# Patient Record
Sex: MALE | Race: WHITE | NOT HISPANIC OR LATINO | Employment: FULL TIME | URBAN - METROPOLITAN AREA
[De-identification: names, ages, dates, MRNs, and addresses within clinical notes are randomized per-mention and may not be internally consistent; named-entity substitution may affect disease eponyms.]

---

## 2018-06-19 ENCOUNTER — CLINICAL SUPPORT (OUTPATIENT)
Dept: REHABILITATION | Facility: HOSPITAL | Age: 73
End: 2018-06-19
Payer: MEDICARE

## 2018-06-19 DIAGNOSIS — M54.41 CHRONIC RIGHT-SIDED LOW BACK PAIN WITH RIGHT-SIDED SCIATICA: ICD-10-CM

## 2018-06-19 DIAGNOSIS — G89.29 CHRONIC RIGHT-SIDED LOW BACK PAIN WITH RIGHT-SIDED SCIATICA: ICD-10-CM

## 2018-06-19 PROCEDURE — 97161 PT EVAL LOW COMPLEX 20 MIN: CPT | Mod: PN

## 2018-06-19 PROCEDURE — G8982 BODY POS GOAL STATUS: HCPCS | Mod: CH,PN

## 2018-06-19 PROCEDURE — G8981 BODY POS CURRENT STATUS: HCPCS | Mod: CI,PN

## 2018-06-19 PROCEDURE — 97110 THERAPEUTIC EXERCISES: CPT | Mod: PN

## 2018-06-27 ENCOUNTER — CLINICAL SUPPORT (OUTPATIENT)
Dept: REHABILITATION | Facility: HOSPITAL | Age: 73
End: 2018-06-27
Payer: MEDICARE

## 2018-06-27 DIAGNOSIS — R19.8 ABDOMINAL WEAKNESS: ICD-10-CM

## 2018-06-27 DIAGNOSIS — M54.41 ACUTE RIGHT-SIDED LOW BACK PAIN WITH RIGHT-SIDED SCIATICA: Primary | ICD-10-CM

## 2018-06-27 DIAGNOSIS — R26.89 DECREASED BACK MOBILITY: ICD-10-CM

## 2018-06-27 DIAGNOSIS — M54.31 RIGHT SCIATIC NERVE PAIN: ICD-10-CM

## 2018-06-27 PROCEDURE — 97110 THERAPEUTIC EXERCISES: CPT | Mod: PN

## 2018-06-27 NOTE — PROGRESS NOTES
"Name: Robetr Jessica  Clinic Number: [id number]  Date of Treatment: 06/27/2018   Diagnosis:   Encounter Diagnoses   Name Primary?    Acute right-sided low back pain with right-sided sciatica Yes    Abdominal weakness     Right sciatic nerve pain     Decreased back mobility        Time in: 0804  Time Out: 1000  Total Treatment Time: 56  Group Time: 0      Subjective:    Robert reports that back and R hip pain is present after just waking and is present now due to not moving a lot yet today.  Patient reports their pain to be 6/10 on a 0-10 scale with 0 being no pain and 10 being the worst pain imaginable. He has work later today.  Suzanna present throughout session.     Objective    Robert received therapeutic exercises to develop strength, endurance, ROM, flexibility, posture and core stabilization for 56 minutes including:     NUStep lvl 5 x 10'  Seated HS stretch 30"x3  Supine piriformis stretch 30"x3  Supine PPT with 5 second hold x 10  Ball squeeze x 30  Bridge + ball squeeze 10 x 3  Cat/cow x 30 with tactile cues  Pulley:   Rows 10 x 3 30#   Shoulder extension 10 x 3 30#    Written Home Exercises Provided: Bridge, HS and piriformis stretch    Pt demo fair understanding of the education provided. Robert demonstrated fair return demonstration of activities.     Assessment:       Pt with need for consistent cueing with exercises, demo'ing preference to perform faster than required for good stabilization. Pt is eager to participate in all activities. He will continue to benefit from skilled PT intervention. Medical Necessity is demonstrated by:  Pain limits function of effected part for some activities and Weakness.    Patient is making good progress towards established goals.    New/Revised goals: NA      Plan:  Continue with established Plan of Care towards PT goals.     Jessica LeJeune, PT, DPT      "

## 2018-06-28 NOTE — PLAN OF CARE
"TIME RECORD    Date: 06/19/2018    Start Time:  0915  Stop Time:  1004    PROCEDURES:    TIMED  Procedure Time Min.   Therapeutic exercise Start: 0950  Stop: 1004 14    Start:  Stop:     Start:  Stop:     Start:  Stop:          UNTIMED  Procedure Time Min.   Evaluation Start: 0915  Stop: 0950 35    Start:  Stop:      Total Timed Minutes:  14  Total Timed Units:  1  Total Untimed Units:  1  Charges Billed/# of units:  2    OUTPATIENT PHYSICAL THERAPY   PATIENT EVALUATION  Onset Date: November 2017  Primary Diagnosis: No diagnosis found.  Treatment Diagnosis: Spinal OA, low back pain  Past Medical History:   Diagnosis Date    Cancer     Prostate    Heartburn     Hypertension      Precautions: Standard  Prior Therapy: None  Medications: Robert Jessica has a current medication list which includes the following prescription(s): cholecalciferol (vitamin d3), fish oil-omega-3 fatty acids, hydrocodone-acetaminophen, lisinopril, lovastatin, multivit/iron/fa/k/herb no.244, and oxybutynin.  Nutrition:  Normal  History of Present Illness: Pt presents with low back and RLE pain that began 11/2017. He had a back surgery (same day - pt sated it "took pressure off the nerve) in April which provided temporary relief but pain has returned and worsened over the last month.   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 They are both deaf and speak ASL.   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 He has had difficulty using the RLE while driving at times and getting in and out of his car. 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 He is able to lift objects and pull open the garage without pain.   Patient Therapy Goals: To decrease pain    Subjective     Robert Jessica states "I feel like the " "pain in my leg is from the nerve.".    Pain:  Location: back and posterior R leg   Description: Aching in low back and Electric in RLE  Activities Which Increase Pain: Laying, Bending, Morning and Getting out of bed/chair  Activities Which Decrease Pain: movement  Pain Scale: 5/10 at best 5/10 now  7-10/10 at worst    Objective     Posture: Decreased curvature of lumbar spine with posterior pelvic tilt, thoracic kyphosis and significant forward head posture.   Palpation: Normal muscle tone, no TTP throughout spine and hips  Sensation: Impaired with c/o numbness to R big toe  Range of Motion/Strength:      Right     Left      Pain/Dysfunction with Movement   Lumbar Spine  AROM PROM MMT  AROM PROM   MMT     Flexion 111  3+/5   3+/5 Excessive ROM from hips vs lumbar spine   Extension 15  3+/5   3+/5 None   Sidebending 10  3+/5 23  3+/5 Pain toward R   Rotation 10  3+/5 10  3+/5  Pain toward R   Tenderness and mildly decreased mobility noted with PA and lateral mobilization from L1-5  Flexibility: Popliteal angle 60° bilaterally  Gait: Without AD  Analysis: No significant gait deviations noted  Bed Mobility:Independent  Transfers: Independent  Other: SLR (+) for nerve pain to proximal calf with ankle DF    Treatment:   - R sciatic nerve glide performed with grade I oscillation at ankle 30 seconds x 3 reps.   - Hooklying LTR with 5 second hold x 5 b/l  - Pt instructed in LTR to perform in AM before getting out of bed    Assessment       Initial Assessment (Pertinent finding, problem list and factors affecting outcome): Pt presents with decreased mobility in lower thoracic and lumbar spine, stiffness with immobility and pain referred to RLE which limits ability with transitions, driving and some work activities. He would benefit from continued PT for increased ROM, improved posture and manual therapy to decrease pain in low back and RLE.   Rehab Potiential: good    Short Term Goals (3 Weeks):   1. Pt will demonstrate " improvement in hamstring flexibility with popliteal angle 65 degrees  2. Pt will perform 5x STS without increase in back pain  3. Pt will be independent with home flexibility program  4. 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 Pt will demo normal PA movement of lumbar vertebrae for improved mobility  Long Term Goals (6 Weeks):   1. Pt will demonstrate improvement in hamstring flexibility with popliteal angle 70 degrees  2. Pt will be independent with home flexibility and nerve glide program  3. 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 Pt will be able to drive x 30 minutes with no difficulty using RLE on pedals    Plan     Certification Period: 6/19/2018 to 7/27/2018  Recommended Treatment Plan: 2 times per week for 6 weeks: Manual Therapy, Moist Heat/ Ice, Neuromuscular Re-ed, Patient Education, Therapeutic Activites and Therapeutic Exercise      Therapist: Jessica L Lejeune, PT    I CERTIFY THE NEED FOR THESE SERVICES FURNISHED UNDER THIS PLAN OF TREATMENT AND WHILE UNDER MY CARE    Physician's comments: ________________________________________________________________________________________________________________________________________________      Physician's Name: ___________________________________

## 2018-06-29 ENCOUNTER — CLINICAL SUPPORT (OUTPATIENT)
Dept: REHABILITATION | Facility: HOSPITAL | Age: 73
End: 2018-06-29
Payer: MEDICARE

## 2018-06-29 DIAGNOSIS — M54.31 RIGHT SCIATIC NERVE PAIN: Primary | ICD-10-CM

## 2018-06-29 PROCEDURE — 97110 THERAPEUTIC EXERCISES: CPT | Mod: PN

## 2018-06-29 NOTE — PROGRESS NOTES
"Name: Robert Jessica  Clinic Number: [id number]  Date of Treatment: 06/29/2018   Diagnosis: No diagnosis found.    Time in: 1600  Time Out: 1700  Total Treatment Time: 60  Group Time: no      Subjective:    Robert reports thigh to calf pain in muscles.  Patient reports their pain to be 7/10 on a 0-10 scale with 0 being no pain and 10 being the worst pain imaginable. Patient stated he walked a lot today and is very tired so he had no relief because he is now really tired after exercising.    Objective    Robert received therapeutic exercises to develop strength, endurance, ROM, flexibility and core stabilization for 60 minutes including:     Nu-Step L-3 x 10 min  Gastroc stretch 2/20" over 1/2 roll  HSS 3/30" with strap in supine using push/pull  Piriformis stretch 2/20"  LTR over Green PB x 30  DKTC over PB x 30  Ball squeeze x 30  Clamshells with GTB x 30  Heel slides x 30 2#  SLR 3 x 10 2#  TaSets in supine over Green PB x 30 with 3 sec hold  Shuttle mini squats 50# x 30  Calf raises 50# x 30  Nerve glides (Supine R leg ext rotated and perform distraction with SLR)      Written Home Exercises Provided: continue with current  Pt demo fair understanding of the education provided. Robert demonstrated good return demonstration of activities.     Assessment:     Patient did well with most exercises but has limited ROM in hamstrings to 45 degrees which could be cause of some back and sciatic pain.  Pt will continue to benefit from skilled PT intervention. Medical Necessity is demonstrated by:  Pain limits function of effected part for some activities, Requires skilled supervision to complete and progress HEP and Weakness.    Patient is making fair progress towards established goals.    Plan:  Continue with established Plan of Care towards PT goals.   "

## 2018-07-02 ENCOUNTER — CLINICAL SUPPORT (OUTPATIENT)
Dept: REHABILITATION | Facility: HOSPITAL | Age: 73
End: 2018-07-02
Payer: MEDICARE

## 2018-07-02 DIAGNOSIS — M54.31 RIGHT SCIATIC NERVE PAIN: ICD-10-CM

## 2018-07-02 DIAGNOSIS — R19.8 ABDOMINAL WEAKNESS: Primary | ICD-10-CM

## 2018-07-02 DIAGNOSIS — R26.89 DECREASED BACK MOBILITY: ICD-10-CM

## 2018-07-02 PROCEDURE — 97110 THERAPEUTIC EXERCISES: CPT | Mod: PN

## 2018-07-02 NOTE — PROGRESS NOTES
"Name: Robert Jessica  Clinic Number: [id number]  Date of Treatment: 07/02/2018   Diagnosis:   Encounter Diagnoses   Name Primary?    Abdominal weakness Yes    Decreased back mobility     Right sciatic nerve pain        Time in: 0801  Time Out: 0905  Total Treatment Time: 64      Subjective:    Robert reports "I was hurting more after my last visit", translated thru .  Patient reports their pain to be 4/10 on a 0-10 scale with 0 being no pain and 10 being the worst pain imaginable.    Objective    Patient received individual therapy to increase strength, endurance, ROM, flexibility, posture and core stabilization with 0 patients with activities as follows:     Robert received therapeutic exercises to develop strength, endurance, ROM, flexibility, posture and core stabilization for 64 minutes including:  Nu-Step L-3 x 10 min  Gastroc stretch 3/30" over 1/2 roll  HSS 3/30" seated  Piriformis stretch in supine 3/30"  LTR over Green PB x 30  DKTC over PB x 30  Ball squeeze x 30  Clamshells with GTB x 30  SLR 3 x 10   Shuttle mini squats 50# x 30  Calf raises 50# x 30      Written Home Exercises Provided: Distributed written and pictoral HEP, with stretches, sleeping positions, and sup<>sit transfer education  Pt demo fair understanding of the education provided. Robert demonstrated good return demonstration of activities.     Assessment:   Patient with increased mobility and confidence following todays visit.  Good participation with minimal barriers in communication for technique and pacing with therex, which patient requires.    Pt will continue to benefit from skilled PT intervention. Medical Necessity is demonstrated by:  Pain limits function of effected part for some activities, Requires skilled supervision to complete and progress HEP and Weakness.    Patient is making good progress towards established goals.    Plan:  Continue with established Plan of Care towards PT goals.   "

## 2018-07-05 ENCOUNTER — CLINICAL SUPPORT (OUTPATIENT)
Dept: REHABILITATION | Facility: HOSPITAL | Age: 73
End: 2018-07-05
Payer: MEDICARE

## 2018-07-05 DIAGNOSIS — R26.89 DECREASED BACK MOBILITY: ICD-10-CM

## 2018-07-05 DIAGNOSIS — M54.31 RIGHT SCIATIC NERVE PAIN: ICD-10-CM

## 2018-07-05 DIAGNOSIS — R19.8 ABDOMINAL WEAKNESS: Primary | ICD-10-CM

## 2018-07-05 PROCEDURE — 97110 THERAPEUTIC EXERCISES: CPT | Mod: PN

## 2018-07-05 NOTE — PROGRESS NOTES
"Name: Robert Jessica  Clinic Number: [id number]  Date of Treatment: 07/05/2018   Diagnosis:   Encounter Diagnoses   Name Primary?    Abdominal weakness Yes    Decreased back mobility     Right sciatic nerve pain        Time in: 0810  Time Out: 0905  Total Treatment Time: 55      Subjective:    Robert reports improvement of symptoms and decreased pain. Pt states(through ASL )  "When I stand up my back hurts, but after 15 min it gets better. I did some of my exercises in packet." Pt reports the pain going down his right leg is not as often. Patient reports their pain to be 4/10 on a 0-10 scale with 0 being no pain and 10 being the worst pain imaginable. Pt reports post tx pain as 3-4/10.    Objective  Robert received therapeutic exercises to develop strength, endurance, ROM, flexibility, posture and core stabilization for 55 minutes including:    Bike L2 x 10 min   Gastroc stretch 3/30" over 1/2 roll   HSS 3/30" seated   Piriformis stretch in sitting 3/30"   LTR over Green PB x 30   DKTC over PB x 30   Ball squeeze x 30   Clamshells with GTB x 30   SLR 3 x 10    Cat/Cow 3/10   PPT 3 x 10    Written Home Exercises Provided: Cont with HEP BID, upright trunk posture education with cues to remind himself daily.   Pt demo good understanding of the education provided. Robert demonstrated good return demonstration of activities.     Assessment:   Pt experienced cramp in hamstrings during PPT. Pt improving with quality of therex performed in session. Remains requiring VC/TC throughout session to ensure correct technique. Decreased lumbar flexion and extension with cat/cow exercise.    Pt will continue to benefit from skilled PT intervention. Medical Necessity is demonstrated by:  Continued inability to participate in vocational pursuits, Pain limits function of effected part for some activities, Requires skilled supervision to complete and progress HEP and Weakness.    Patient is making good progress towards " established goals.    Plan:  Continue with established Plan of Care towards PT goals.

## 2018-07-09 ENCOUNTER — CLINICAL SUPPORT (OUTPATIENT)
Dept: REHABILITATION | Facility: HOSPITAL | Age: 73
End: 2018-07-09
Payer: MEDICARE

## 2018-07-09 DIAGNOSIS — M54.41 CHRONIC RIGHT-SIDED LOW BACK PAIN WITH RIGHT-SIDED SCIATICA: Primary | ICD-10-CM

## 2018-07-09 DIAGNOSIS — G89.29 CHRONIC RIGHT-SIDED LOW BACK PAIN WITH RIGHT-SIDED SCIATICA: Primary | ICD-10-CM

## 2018-07-09 PROBLEM — M54.50 LOW BACK PAIN: Status: ACTIVE | Noted: 2018-07-09

## 2018-07-09 PROBLEM — R29.898 LEG WEAKNESS: Status: ACTIVE | Noted: 2018-07-09

## 2018-07-09 PROCEDURE — G8978 MOBILITY CURRENT STATUS: HCPCS | Mod: CH,PN

## 2018-07-09 PROCEDURE — 97110 THERAPEUTIC EXERCISES: CPT | Mod: PN

## 2018-07-09 NOTE — PROGRESS NOTES
Name: Robert Jessica  Clinic Number: [id number]  Date of Treatment: 07/09/2018   Diagnosis:   Encounter Diagnosis   Name Primary?    Chronic right-sided low back pain with right-sided sciatica Yes       Time in: 0900  Time Out: 1000  Total Treatment Time: 60  Group Time: NA      Subjective:    Robert reports no real change in pain levels.  Patient reports their pain to be 4/10 on a 0-10 scale with 0 being no pain and 10 being the worst pain imaginable.    Objective    Robert received therapeutic exercises to develop strength, endurance and flexibility for 60 minutes including:     X 20 lumbar rotations  X 20 PPT  5 x 10 sec yanna. SKTC  X 20 DKTC w/ green t-ball  X 20 supine yanna. LE there ex (2#) = HS, hip ABD/ADD, SLR  X 20 seated yanna. LE there ex = marching (2#), LAQ (2#), ball squeezes, hip ABD (GTB)  2 x 20 shuttle mini squats and calf raises (62#)  X 10 min NuStep (L4)    Assessment:     Mr. Jessica was able to jyothi. tx session w/out increase in symptoms.    Pt will continue to benefit from skilled PT intervention. Medical Necessity is demonstrated by:  Pain limits function of effected part for some activities, Unable to participate fully in daily activities and Weakness.    Patient is making good progress towards established goals.    New/Revised goals: NA      Plan:  Continue with established Plan of Care towards PT goals.

## 2018-07-12 ENCOUNTER — CLINICAL SUPPORT (OUTPATIENT)
Dept: REHABILITATION | Facility: HOSPITAL | Age: 73
End: 2018-07-12
Payer: MEDICARE

## 2018-07-12 DIAGNOSIS — R29.898 WEAKNESS OF LOWER EXTREMITY, UNSPECIFIED LATERALITY: ICD-10-CM

## 2018-07-12 PROCEDURE — 97110 THERAPEUTIC EXERCISES: CPT | Mod: PN

## 2018-07-12 NOTE — PROGRESS NOTES
"Name: Robert Jessica  Clinic Number: [id number]  Date of Treatment: 07/12/2018   Diagnosis:   Encounter Diagnosis   Name Primary?    Weakness of lower extremity, unspecified laterality        Time in: 0805  Time Out: 0900  Total Treatment Time: 55      Subjective:    Robert reports "My back always hurts in the mornings after I have worked the day before."  Patient reports their pain to be 4/10 on a 0-10 scale with 0 being no pain and 10 being the worst pain imaginable.    Objective  Robert received therapeutic exercises to develop strength, endurance, ROM, flexibility, posture and core stabilization for 55 minutes including:  Bike L2 x 10 min  Gastroc stretch 3/30" over 1/2 roll  HSS 3/30" seated  Piriformis stretch in sitting 3/30"  LTR over Green PB x 30  DKTC over PB x 30  Ball squeeze x 30  Clamshells with GTB x 30  SLR 3 x 10   Cat/Cow 3/10  PPT 3 x 10  Bridges 3 x 10    Written Home Exercises Provided: Cont with HEP  Pt demo good understanding of the education provided. Robert demonstrated good return demonstration of activities.     Assessment:   Good participation with therex, patient consistently getting cramps in hamstrings with core strengthening therex when in hooklying position.    Pt will continue to benefit from skilled PT intervention. Medical Necessity is demonstrated by:  Weakness, fall risk, supervision for progression of HEP.    Patient is making good progress towards established goals.    Plan:  Continue with established Plan of Care towards PT goals.   "

## 2018-07-16 ENCOUNTER — CLINICAL SUPPORT (OUTPATIENT)
Dept: REHABILITATION | Facility: HOSPITAL | Age: 73
End: 2018-07-16
Payer: MEDICARE

## 2018-07-16 DIAGNOSIS — R29.898 WEAKNESS OF LOWER EXTREMITY, UNSPECIFIED LATERALITY: ICD-10-CM

## 2018-07-16 PROCEDURE — 97110 THERAPEUTIC EXERCISES: CPT | Mod: PN

## 2018-07-16 NOTE — PROGRESS NOTES
Name: Robert Jessica  Clinic Number: [id number]  Date of Treatment: 07/16/2018   Diagnosis:   Encounter Diagnosis   Name Primary?    Weakness of lower extremity, unspecified laterality        Time in: 0857  Time Out: 0956  Total Treatment Time: 59      Subjective:    Robert reports pain down R LE, occasional numbness/tingling R lateral LE and ocasionaly great toe region.  Patient reports their pain to be 4/10 on a 0-10 scale with 0 being no pain and 10 being the worst pain imaginable.    Objective    Patient received individual therapy to increase strength, flexibility, posture and core stabilization with activities as follows:     Robert received therapeutic exercises to develop strength, flexibility, posture and core stabilization for 59 minutes including:     Nustep level 4 x 10 minutes  Supine hamstring stretch 3 x 15 sec B,   Piriformis stretch 3 x 20 sec R supine  Bridges x 30  DKC 3 x 30 sec   Ball squeeze x 30  Clamshell GTB supine, R SL, L SL x 30 each  SLR x 30 B  Supine hip abd x 30 B  Neural glide RLE: knee extended, hip apporx 75 deg, df/pf x 30  Push/pull technique for SIJ 2 x 3 sets    Written Home Exercises Provided: cont HEP  Pt demo fair understanding of the education provided. Robert demonstrated fair return demonstration of activities.     Assessment:     Pain decreasing overall.  Pain R hip region with piriformis stretch.  Tight hamstrings B.  Pt will continue to benefit from skilled PT intervention. Medical Necessity is demonstrated by:  Pain limits function of effected part for some activities, Unable to participate fully in daily activities, Requires skilled supervision to complete and progress HEP and Weakness.    Patient is making good progress towards established goals.      Plan:  Continue with established Plan of Care towards PT goals.

## 2018-07-19 ENCOUNTER — CLINICAL SUPPORT (OUTPATIENT)
Dept: REHABILITATION | Facility: HOSPITAL | Age: 73
End: 2018-07-19
Payer: MEDICARE

## 2018-07-19 DIAGNOSIS — R29.898 WEAKNESS OF LOWER EXTREMITY, UNSPECIFIED LATERALITY: ICD-10-CM

## 2018-07-19 PROCEDURE — 97110 THERAPEUTIC EXERCISES: CPT | Mod: PN

## 2018-07-19 NOTE — PROGRESS NOTES
"Name: Robert Jessica  Clinic Number: [id number]  Date of Treatment: 07/19/2018   Diagnosis:   Encounter Diagnosis   Name Primary?    Weakness of lower extremity, unspecified laterality        Time in: 0800  Time Out: 0915  Total Treatment Time: 75    Subjective:    Robert states "When I get out of bed in the morning my pain is a 6/10, however, during the day it decreases to a 2-4/10; walking helps lower my pain to a 2/10." Pt reports tingling down R LE. Pt reports doing push/pull exercises at home since last tx.  Patient reports their pain to be 4/10 on a 0-10 scale with 0 being no pain and 10 being the worst pain imaginable.    Objective    Patient received individual therapy to increase strength, flexibility, posture and core stabilization with 0 patients with activities as follows:     Bike L4 10'  HS stretch seated 3/30s    Supine:  Piriformis stretch 3/30s  Bridges 3/10  DKTC with tball 3/30s  LTR with tball 3/10  Clams GTB supine, R SL, L SL 3/10 each  Ball squeeze 3/10  SLR 3/10 B  TrA with theraball 3/10 with 3" hold  Hip ABD in supine 3/10 B   Neural glide RLE: hip and knee bent with DF/PF 3/10  Push/pull technique for SIJ  2 x 3 sets with submax isometric contraction with 3" hold    Written Home Exercises Provided: cont with HEP.  Pt demo good understanding of the education provided. Robert demonstrated good return demonstration of activities.     Assessment:   Pt tolerated progression of added exercises of TrA and LTR 3/10 each well. Pt stated he could feel his core mm contract better with TrA with theraball in supine vs on bike with same isometric contraction of core. Pt exhibited increased tightness in piriformis. Pt's demonstrates increased quality of eccentric and concentric contractions at a slow, steady pace during SLR exercise. Instructed pt on proper body mechanics for bending and lifting upon leaving session to prevent LBP and strain.     Pt will continue to benefit from skilled PT intervention. " Medical Necessity is demonstrated by:  Continued inability to participate in vocational pursuits, Pain limits function of effected part for some activities, Requires skilled supervision to complete and progress HEP and Weakness.    Patient is making good progress towards established goals.    Plan:  Continue with established Plan of Care towards PT goals.     Therapy was performed with SPTA present for entire treatment session supervised by Licensed PTA.

## 2018-07-23 ENCOUNTER — CLINICAL SUPPORT (OUTPATIENT)
Dept: REHABILITATION | Facility: HOSPITAL | Age: 73
End: 2018-07-23
Payer: MEDICARE

## 2018-07-23 DIAGNOSIS — M54.41 ACUTE RIGHT-SIDED LOW BACK PAIN WITH RIGHT-SIDED SCIATICA: Primary | ICD-10-CM

## 2018-07-23 PROCEDURE — 97110 THERAPEUTIC EXERCISES: CPT | Mod: PN

## 2018-07-23 NOTE — PROGRESS NOTES
Name: Robert Jessica  Clinic Number: [id number]  Date of Treatment: 07/23/2018   Diagnosis:   Encounter Diagnosis   Name Primary?    Acute right-sided low back pain with right-sided sciatica Yes       Time in: 0855  Time Out: 0953  Total Treatment Time: 58      Subjective:    Robert reports a little better today.  Patient reports their pain to be 2/10 on a 0-10 scale with 0 being no pain and 10 being the worst pain imaginable.    Objective    Patient received individual therapy to increase strength, flexibility, posture and core stabilization with activities as follows:     Robert received therapeutic exercises to develop strength, flexibility, posture and core stabilization for 58 minutes including:     Nustep level 4 x 10 minutes  Standing gastroc stretch 3 x 30 sec B  Piriformis stretch 3 x 30 sec B  Standing  TrA x 30  PPT x 30  Bridges x 30  LTR x 30 B  Clamshells: GTB; supine, R SL, L SL x 30 each  Dying bug x 15 B  Supine hip abd x 30 B  SIJ mobilization, push/pull technique 2 x 3 sets    Written Home Exercises Provided: cont HEP  Pt demo good understanding of the education provided. Robert demonstrated good return demonstration of activities.     Assessment:     Pain decreasing overall.  Good tolerance for activity.  Pt will continue to benefit from skilled PT intervention. Medical Necessity is demonstrated by:  Pain limits function of effected part for some activities, Unable to participate fully in daily activities, Requires skilled supervision to complete and progress HEP and Weakness.    Patient is making good progress towards established goals.      Plan:  Continue with established Plan of Care towards PT goals.

## 2018-07-27 ENCOUNTER — CLINICAL SUPPORT (OUTPATIENT)
Dept: REHABILITATION | Facility: HOSPITAL | Age: 73
End: 2018-07-27
Payer: MEDICARE

## 2018-07-27 DIAGNOSIS — R29.898 WEAKNESS OF LOWER EXTREMITY, UNSPECIFIED LATERALITY: ICD-10-CM

## 2018-07-27 DIAGNOSIS — M54.41 ACUTE RIGHT-SIDED LOW BACK PAIN WITH RIGHT-SIDED SCIATICA: ICD-10-CM

## 2018-07-27 DIAGNOSIS — M54.31 RIGHT SCIATIC NERVE PAIN: ICD-10-CM

## 2018-07-27 PROCEDURE — 97110 THERAPEUTIC EXERCISES: CPT | Mod: PN | Performed by: PHYSICAL THERAPIST

## 2018-07-27 PROCEDURE — G8978 MOBILITY CURRENT STATUS: HCPCS | Mod: CI,PN | Performed by: PHYSICAL THERAPIST

## 2018-07-27 PROCEDURE — G8979 MOBILITY GOAL STATUS: HCPCS | Mod: CI,PN | Performed by: PHYSICAL THERAPIST

## 2018-07-29 NOTE — PLAN OF CARE
"TIME RECORD    Date: 07/29/2018    Start Time:  900  Stop Time:  1000    PROCEDURES:    TIMED  Procedure Time Min.    Start:  Stop:     Start:  Stop:     Start:  Stop:     Start:  Stop:          UNTIMED  Procedure Time Min.    Start:  Stop:     Start:  Stop:      Total Timed Minutes:  60  Total Timed Units:  4  Total Untimed Units:  0  Charges Billed/# of units:  4    PHYSICAL THERAPY UPDATED PLAN OF TREATMENT    Patient name: Robert Jessica  Onset Date:  November 2007  SOC Date:  6/19/2018  Primary Diagnosis:    1. Acute right-sided low back pain with right-sided sciatica     2. Weakness of lower extremity, unspecified laterality     3. 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 He has had difficulty using the RLE while driving at times and getting in and out of his car. 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 He is able to lift objects and pull open the garage without pain    Updated Assessment:    S: The patient reports decreased pain but continues to have pain with prolonged activty    O:  LROM   Previous  Current  Flexion   NT   28*   Extension  15*   15*  R side bending 10*   13*  L side bending  13*   13*    Flexibility:   Left Right  Left Right  Hamstrings  60* 60*  70* 70*         Oswestry  NT%   16%  Impairment    A: The patent is progressing with decreased pain.    Previous Short Term Goals Status:     1. Pt will demonstrate improvement in hamstring flexibility with popliteal angle 65 degrees  2. Pt will perform 5x STS without increase in back pain  3. Pt will be independent with home flexibility program  4. 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 Pt will demo normal PA movement of lumbar vertebrae for improved " mobility    Long Term Goal Status:   modified:    1. Pt will demonstrate improvement in hamstring flexibility with popliteal angle 80 degrees  2. Pt will be independent with home flexibility and nerve glide program  3. 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 Pt will be able to drive x 30 minutes with no difficulty using RLE on pedals    Reasons for Recertification of Therapy:   Improve LE flexibility and and decrease pain with activty    Certification Period: 7/31/2018 to 9/18/2018  Recommended Treatment Plan: 2 times per week for 6 weeks: Cervical/Lumbar Traction, Manual Therapy, Moist Heat/ Ice, Patient Education, Therapeutic Activites and Therapeutic Exercise  Other Recommendations: Progress as tolerated        Therapist's Name: Misha Fitch, PT   Date: 07/29/2018    I CERTIFY THE NEED FOR THESE SERVICES FURNISHED UNDER THIS PLAN OF TREATMENT AND WHILE UNDER MY CARE    Physician's comments: ________________________________________________________________________________________________________________________________________________      Physician's Name: ___________________________________

## 2018-07-29 NOTE — PROGRESS NOTES
"Name: Robert Jessica  [facility] Number: [id number]  Date of Treatment: 07/29/2018   Diagnosis:   Encounter Diagnoses   Name Primary?    Acute right-sided low back pain with right-sided sciatica     Weakness of lower extremity, unspecified laterality     Right sciatic nerve pain        Time in: 900  Time Out: 1000  Total Treatment Time: 60  Group Time: 0      Subjective:    Robert reports improvement of symptoms.  Patient reports their pain to be 3/10 on a 0-10 scale with 0 being no pain and 10 being the worst pain imaginable.    Objective    Patient received individual therapy to increase strength, endurance, ROM and flexibility with 0 patients with activities as follows:     Robert received therapeutic exercises to develop strength, endurance, ROM and flexibility for 60 minutes including:     NuStep L2 x 10 min  Gastroc stretch 3/30" over 1/2 roll  HSS 3/30" seated  Piriformis stretch in sitting 3/30"  LTR over Green PB x 30  DKTC over PB x 30  Ball squeeze x 30  Clamshells with GTB x 30  SLR 3 x 10   Cat/Cow 3/10  PPT 3 x 10  Bridges 3 x 10      Assessment:       Pt will continue to benefit from skilled PT intervention. Medical Necessity is demonstrated by:  Requires skilled supervision to complete and progress HEP and Weakness.    Patient is making good progress towards established goals.    New/Revised goals: See UPOC      Plan:  Continue with established Plan of Care towards PT goals.   "

## 2018-08-01 ENCOUNTER — CLINICAL SUPPORT (OUTPATIENT)
Dept: REHABILITATION | Facility: HOSPITAL | Age: 73
End: 2018-08-01
Attending: NEUROLOGICAL SURGERY
Payer: MEDICARE

## 2018-08-01 DIAGNOSIS — R29.898 WEAKNESS OF LOWER EXTREMITY, UNSPECIFIED LATERALITY: ICD-10-CM

## 2018-08-01 DIAGNOSIS — M54.41 ACUTE RIGHT-SIDED LOW BACK PAIN WITH RIGHT-SIDED SCIATICA: ICD-10-CM

## 2018-08-01 DIAGNOSIS — R26.89 DECREASED BACK MOBILITY: ICD-10-CM

## 2018-08-01 DIAGNOSIS — M54.31 RIGHT SCIATIC NERVE PAIN: ICD-10-CM

## 2018-08-01 PROCEDURE — G8979 MOBILITY GOAL STATUS: HCPCS | Mod: CI,PN

## 2018-08-01 PROCEDURE — G8980 MOBILITY D/C STATUS: HCPCS | Mod: CI,PN

## 2018-08-01 PROCEDURE — 97110 THERAPEUTIC EXERCISES: CPT | Mod: PN

## 2018-08-05 NOTE — PLAN OF CARE
REHAB SERVICES OUTPATIENT DISCHARGE SUMMARY  Physical Therapy      Name:  Robert Jessica  Date:  08/01/2018  Date of Evaluation:  06/19/2018  Physician:  Dr. Nikhil Duggan MD  Total # Of Visits:  12  Cancelled:  0  No Shows:  0  Diagnosis:    1. Acute right-sided low back pain with right-sided sciatica     2. Weakness of lower extremity, unspecified laterality     3. Right sciatic nerve pain         Physical/Functional Status:  At time of discharge, patient was able to Stand at sink to brush teeth/shave with minimal pain, drive with decreased difficulty using R LE, perform sit <> stand with minimal pain, and rise in the morning with less pain.  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      The patient is to be discharged from our Therapy service for the following reason(s):  Patient has essentially met all of his/her goals    Degree of Goal Achievement:  Patient has nearly met all goals    Patient Education:  N/A    Discharge Plan:  Home Program:  Issued illustrated home exercise program

## 2020-02-12 ENCOUNTER — TELEPHONE (OUTPATIENT)
Dept: FAMILY MEDICINE | Facility: CLINIC | Age: 75
End: 2020-02-12

## 2020-02-12 DIAGNOSIS — Z79.899 ENCOUNTER FOR LONG-TERM (CURRENT) USE OF OTHER MEDICATIONS: ICD-10-CM

## 2020-02-12 DIAGNOSIS — I10 HYPERTENSION, UNSPECIFIED TYPE: Chronic | ICD-10-CM

## 2020-02-12 DIAGNOSIS — Z00.00 ROUTINE GENERAL MEDICAL EXAMINATION AT A HEALTH CARE FACILITY: Primary | ICD-10-CM

## 2020-02-12 NOTE — TELEPHONE ENCOUNTER
----- Message from Estrella Taylor sent at 2/12/2020  4:00 PM CST -----  Pt wants his blood work done before his next appt on 02/21. Please send the lab orders over. Pt #[phone]

## 2020-02-17 ENCOUNTER — TELEPHONE (OUTPATIENT)
Dept: FAMILY MEDICINE | Facility: CLINIC | Age: 75
End: 2020-02-17

## 2020-02-17 NOTE — TELEPHONE ENCOUNTER
----- Message from Renzo Landis sent at 2/17/2020  4:09 PM CST -----  Pt wants to know ill an  be at his appt Friday   Pt [phone]

## 2020-02-17 NOTE — TELEPHONE ENCOUNTER
----- Message from Renzo Landis sent at 2/17/2020  4:07 PM CST -----  Atorvastatin 40 mg   Pharm [facility]   Pt [phone]

## 2020-02-18 ENCOUNTER — TELEPHONE (OUTPATIENT)
Dept: FAMILY MEDICINE | Facility: CLINIC | Age: 75
End: 2020-02-18

## 2020-02-18 NOTE — TELEPHONE ENCOUNTER
----- Message from Renzo Landis sent at 2/18/2020  3:44 PM CST -----  [facility] is the pharm please send med in   ----- Message -----  From: Renzo Landis  Sent: 2/17/2020   4:07 PM CST  To: Henry Villaseñor Staff    Atorvastatin 40 mg   Pharm [facility]   Pt [phone]

## 2020-02-19 DIAGNOSIS — E78.5 HYPERLIPIDEMIA, UNSPECIFIED HYPERLIPIDEMIA TYPE: Primary | ICD-10-CM

## 2020-02-19 LAB
ALBUMIN SERPL-MCNC: 4.5 G/DL (ref 3.6–5.1)
ALBUMIN/CREAT UR: 3 MCG/MG CREAT
ALBUMIN/GLOB SERPL: 1.8 (CALC) (ref 1–2.5)
ALP SERPL-CCNC: 56 U/L (ref 35–144)
ALT SERPL-CCNC: 26 U/L (ref 9–46)
APPEARANCE UR: CLEAR
AST SERPL-CCNC: 20 U/L (ref 10–35)
BACTERIA #/AREA URNS HPF: NORMAL /HPF
BACTERIA UR CULT: NORMAL
BASOPHILS # BLD AUTO: 41 CELLS/UL (ref 0–200)
BASOPHILS NFR BLD AUTO: 0.9 %
BILIRUB SERPL-MCNC: 0.6 MG/DL (ref 0.2–1.2)
BILIRUB UR QL STRIP: NEGATIVE
BUN SERPL-MCNC: 22 MG/DL (ref 7–25)
BUN/CREAT SERPL: ABNORMAL (CALC) (ref 6–22)
CALCIUM SERPL-MCNC: 9.6 MG/DL (ref 8.6–10.3)
CHLORIDE SERPL-SCNC: 103 MMOL/L (ref 98–110)
CHOLEST SERPL-MCNC: 166 MG/DL
CHOLEST/HDLC SERPL: 3.2 (CALC)
CO2 SERPL-SCNC: 29 MMOL/L (ref 20–32)
COLOR UR: YELLOW
CREAT SERPL-MCNC: 1 MG/DL (ref 0.7–1.18)
CREAT UR-MCNC: 200 MG/DL (ref 20–320)
EOSINOPHIL # BLD AUTO: 350 CELLS/UL (ref 15–500)
EOSINOPHIL NFR BLD AUTO: 7.6 %
ERYTHROCYTE [DISTWIDTH] IN BLOOD BY AUTOMATED COUNT: 12.2 % (ref 11–15)
GFRSERPLBLD MDRD-ARVRAT: 75 ML/MIN/1.73M2
GLOBULIN SER CALC-MCNC: 2.5 G/DL (CALC) (ref 1.9–3.7)
GLUCOSE SERPL-MCNC: 109 MG/DL (ref 65–99)
GLUCOSE UR QL STRIP: NEGATIVE
HCT VFR BLD AUTO: 40.9 % (ref 38.5–50)
HDLC SERPL-MCNC: 52 MG/DL
HGB BLD-MCNC: 13.8 G/DL (ref 13.2–17.1)
HGB UR QL STRIP: NEGATIVE
HYALINE CASTS #/AREA URNS LPF: NORMAL /LPF
KETONES UR QL STRIP: NEGATIVE
LDLC SERPL CALC-MCNC: 98 MG/DL (CALC)
LEUKOCYTE ESTERASE UR QL STRIP: NEGATIVE
LYMPHOCYTES # BLD AUTO: 1371 CELLS/UL (ref 850–3900)
LYMPHOCYTES NFR BLD AUTO: 29.8 %
MCH RBC QN AUTO: 30.6 PG (ref 27–33)
MCHC RBC AUTO-ENTMCNC: 33.7 G/DL (ref 32–36)
MCV RBC AUTO: 90.7 FL (ref 80–100)
MICROALBUMIN UR-MCNC: 0.5 MG/DL
MONOCYTES # BLD AUTO: 501 CELLS/UL (ref 200–950)
MONOCYTES NFR BLD AUTO: 10.9 %
NEUTROPHILS # BLD AUTO: 2337 CELLS/UL (ref 1500–7800)
NEUTROPHILS NFR BLD AUTO: 50.8 %
NITRITE UR QL STRIP: NEGATIVE
NONHDLC SERPL-MCNC: 114 MG/DL (CALC)
PH UR STRIP: NORMAL [PH] (ref 5–8)
PLATELET # BLD AUTO: 258 THOUSAND/UL (ref 140–400)
PMV BLD REES-ECKER: 9.4 FL (ref 7.5–12.5)
POTASSIUM SERPL-SCNC: 4.3 MMOL/L (ref 3.5–5.3)
PROT SERPL-MCNC: 7 G/DL (ref 6.1–8.1)
PROT UR QL STRIP: NEGATIVE
RBC # BLD AUTO: 4.51 MILLION/UL (ref 4.2–5.8)
RBC #/AREA URNS HPF: NORMAL /HPF
SODIUM SERPL-SCNC: 139 MMOL/L (ref 135–146)
SP GR UR STRIP: 1.03 (ref 1–1.03)
SQUAMOUS #/AREA URNS HPF: NORMAL /HPF
TRIGL SERPL-MCNC: 70 MG/DL
TSH SERPL-ACNC: 0.88 MIU/L (ref 0.4–4.5)
WBC # BLD AUTO: 4.6 THOUSAND/UL (ref 3.8–10.8)
WBC #/AREA URNS HPF: NORMAL /HPF

## 2020-02-19 RX ORDER — ATORVASTATIN CALCIUM 40 MG/1
40 TABLET, FILM COATED ORAL DAILY
Qty: 30 TABLET | Refills: 0 | Status: SHIPPED | OUTPATIENT
Start: 2020-02-19 | End: 2020-02-28 | Stop reason: SDUPTHER

## 2020-02-19 NOTE — TELEPHONE ENCOUNTER
I just received an email saying that they will not have an interpretor available for his appt on Friday morning @820am. We can either use the gera or reschedule. Ms. Reed lmor for pt to call back

## 2020-02-19 NOTE — TELEPHONE ENCOUNTER
----- Message from Brianna Owens sent at 2/19/2020  3:09 PM CST -----  Contact: PTS wife called me back.   Please give this message to Shaunna. The patient rescheduled his apt for 02/28/2020 at 8:20 am  with Dr. Villaseñor. Please set up an  for his new apt. He only has 2 pills left of his atorvastatin 40 mg. Can you call in 1 month to [facility] on [address]. He did have his labs drawn. pts # [phone]

## 2020-02-28 ENCOUNTER — TELEPHONE (OUTPATIENT)
Dept: FAMILY MEDICINE | Facility: CLINIC | Age: 75
End: 2020-02-28

## 2020-02-28 ENCOUNTER — OFFICE VISIT (OUTPATIENT)
Dept: FAMILY MEDICINE | Facility: CLINIC | Age: 75
End: 2020-02-28
Payer: MEDICARE

## 2020-02-28 VITALS
HEART RATE: 64 BPM | HEIGHT: 70 IN | DIASTOLIC BLOOD PRESSURE: 70 MMHG | SYSTOLIC BLOOD PRESSURE: 124 MMHG | BODY MASS INDEX: 24.62 KG/M2 | WEIGHT: 172 LBS

## 2020-02-28 DIAGNOSIS — M65.341 TRIGGER RING FINGER OF RIGHT HAND: ICD-10-CM

## 2020-02-28 DIAGNOSIS — I10 ESSENTIAL HYPERTENSION: Primary | ICD-10-CM

## 2020-02-28 DIAGNOSIS — M54.31 RIGHT SCIATIC NERVE PAIN: ICD-10-CM

## 2020-02-28 DIAGNOSIS — H91.93 BILATERAL DEAFNESS: ICD-10-CM

## 2020-02-28 DIAGNOSIS — Z85.46 HISTORY OF PROSTATE CANCER: ICD-10-CM

## 2020-02-28 DIAGNOSIS — E78.2 MIXED HYPERLIPIDEMIA: ICD-10-CM

## 2020-02-28 PROBLEM — R19.8 ABDOMINAL WEAKNESS: Status: RESOLVED | Noted: 2018-06-27 | Resolved: 2020-02-28

## 2020-02-28 PROCEDURE — 99214 PR OFFICE/OUTPT VISIT, EST, LEVL IV, 30-39 MIN: ICD-10-PCS | Mod: S$GLB,,, | Performed by: FAMILY MEDICINE

## 2020-02-28 PROCEDURE — 99214 OFFICE O/P EST MOD 30 MIN: CPT | Mod: S$GLB,,, | Performed by: FAMILY MEDICINE

## 2020-02-28 PROCEDURE — 1159F MED LIST DOCD IN RCRD: CPT | Mod: S$GLB,,, | Performed by: FAMILY MEDICINE

## 2020-02-28 PROCEDURE — 3078F DIAST BP <80 MM HG: CPT | Mod: S$GLB,,, | Performed by: FAMILY MEDICINE

## 2020-02-28 PROCEDURE — 1101F PT FALLS ASSESS-DOCD LE1/YR: CPT | Mod: S$GLB,,, | Performed by: FAMILY MEDICINE

## 2020-02-28 PROCEDURE — 3074F PR MOST RECENT SYSTOLIC BLOOD PRESSURE < 130 MM HG: ICD-10-PCS | Mod: S$GLB,,, | Performed by: FAMILY MEDICINE

## 2020-02-28 PROCEDURE — 1159F PR MEDICATION LIST DOCUMENTED IN MEDICAL RECORD: ICD-10-PCS | Mod: S$GLB,,, | Performed by: FAMILY MEDICINE

## 2020-02-28 PROCEDURE — 3074F SYST BP LT 130 MM HG: CPT | Mod: S$GLB,,, | Performed by: FAMILY MEDICINE

## 2020-02-28 PROCEDURE — 1101F PR PT FALLS ASSESS DOC 0-1 FALLS W/OUT INJ PAST YR: ICD-10-PCS | Mod: S$GLB,,, | Performed by: FAMILY MEDICINE

## 2020-02-28 PROCEDURE — 3078F PR MOST RECENT DIASTOLIC BLOOD PRESSURE < 80 MM HG: ICD-10-PCS | Mod: S$GLB,,, | Performed by: FAMILY MEDICINE

## 2020-02-28 RX ORDER — ATORVASTATIN CALCIUM 40 MG/1
40 TABLET, FILM COATED ORAL DAILY
Qty: 90 TABLET | Refills: 1 | Status: SHIPPED | OUTPATIENT
Start: 2020-02-28 | End: 2020-08-31 | Stop reason: SDUPTHER

## 2020-02-28 RX ORDER — LISINOPRIL 20 MG/1
20 TABLET ORAL DAILY
Qty: 90 TABLET | Refills: 1 | Status: SHIPPED | OUTPATIENT
Start: 2020-02-28 | End: 2020-08-31 | Stop reason: SDUPTHER

## 2020-02-28 RX ORDER — ACETAMINOPHEN AND PHENYLEPHRINE HCL 325; 5 MG/1; MG/1
TABLET ORAL
COMMUNITY

## 2020-02-28 NOTE — PROGRESS NOTES
SUBJECTIVE:    Patient ID: Robert Jessica is a 74 y.o. male.    Chief Complaint: Follow-up (brought bottles // refused flu and pna shot ac )    This 74-year-old male is deaf but still works full-time at [employer] for a [employer].  He denies any significant aches or pains.  He has a right ring finger trigger finger but he states it does not bother him much.  He had a prostatectomy 2015.  He has intermittent right sciatica but this was treated in  February of 2019 with a laminectomy by .  He still works full-time bending squatting in [place]      Telephone on 02/12/2020   Component Date Value Ref Range Status    WBC 02/17/2020 4.6  3.8 - 10.8 Thousand/uL Final    RBC 02/17/2020 4.51  4.20 - 5.80 Million/uL Final    Hemoglobin 02/17/2020 13.8  13.2 - 17.1 g/dL Final    Hematocrit 02/17/2020 40.9  38.5 - 50.0 % Final    Mean Corpuscular Volume 02/17/2020 90.7  80.0 - 100.0 fL Final    Mean Corpuscular Hemoglobin 02/17/2020 30.6  27.0 - 33.0 pg Final    Mean Corpuscular Hemoglobin Conc 02/17/2020 33.7  32.0 - 36.0 g/dL Final    RDW 02/17/2020 12.2  11.0 - 15.0 % Final    Platelets 02/17/2020 258  140 - 400 Thousand/uL Final    MPV 02/17/2020 9.4  7.5 - 12.5 fL Final    Neutrophils Absolute 02/17/2020 2,337  1,500 - 7,800 cells/uL Final    Lymph # 02/17/2020 1,371  850 - 3,900 cells/uL Final    Mono # 02/17/2020 501  200 - 950 cells/uL Final    Eos # 02/17/2020 350  15 - 500 cells/uL Final    Baso # 02/17/2020 41  0 - 200 cells/uL Final    Neutrophils Relative 02/17/2020 50.8  % Final    Lymph% 02/17/2020 29.8  % Final    Mono% 02/17/2020 10.9  % Final    Eosinophil% 02/17/2020 7.6  % Final    Basophil% 02/17/2020 0.9  % Final    Glucose 02/17/2020 109* 65 - 99 mg/dL Final    BUN, Bld 02/17/2020 22  7 - 25 mg/dL Final    Creatinine 02/17/2020 1.00  0.70 - 1.18 mg/dL Final    eGFR if non African American 02/17/2020 75  > OR = 60 mL/min/1.73m2 Final    eGFR if African  American 02/17/2020 87  > OR = 60 mL/min/1.73m2 Final    BUN/Creatinine Ratio 02/17/2020 NOT APPLICABLE  6 - 22 (calc) Final    Sodium 02/17/2020 139  135 - 146 mmol/L Final    Potassium 02/17/2020 4.3  3.5 - 5.3 mmol/L Final    Chloride 02/17/2020 103  98 - 110 mmol/L Final    CO2 02/17/2020 29  20 - 32 mmol/L Final    Calcium 02/17/2020 9.6  8.6 - 10.3 mg/dL Final    Total Protein 02/17/2020 7.0  6.1 - 8.1 g/dL Final    Albumin 02/17/2020 4.5  3.6 - 5.1 g/dL Final    Globulin, Total 02/17/2020 2.5  1.9 - 3.7 g/dL (calc) Final    Albumin/Globulin Ratio 02/17/2020 1.8  1.0 - 2.5 (calc) Final    Total Bilirubin 02/17/2020 0.6  0.2 - 1.2 mg/dL Final    Alkaline Phosphatase 02/17/2020 56  35 - 144 U/L Final    AST 02/17/2020 20  10 - 35 U/L Final    ALT 02/17/2020 26  9 - 46 U/L Final    Cholesterol 02/17/2020 166  <200 mg/dL Final    HDL 02/17/2020 52  > OR = 40 mg/dL Final    Triglycerides 02/17/2020 70  <150 mg/dL Final    LDL Cholesterol 02/17/2020 98  mg/dL (calc) Final    Hdl/Cholesterol Ratio 02/17/2020 3.2  <5.0 (calc) Final    Non HDL Chol. (LDL+VLDL) 02/17/2020 114  <130 mg/dL (calc) Final    TSH 02/17/2020 0.88  0.40 - 4.50 mIU/L Final    Creatinine, Random Ur 02/17/2020 200  20 - 320 mg/dL Final    Microalb, Ur 02/17/2020 0.5  See Note: mg/dL Final    Microalb Creat Ratio 02/17/2020 3  <30 mcg/mg creat Final    Color, UA 02/17/2020 YELLOW  YELLOW Final    Appearance, UA 02/17/2020 CLEAR  CLEAR Final    Specific Gravity, UA 02/17/2020 1.032  1.001 - 1.035 Final    pH, UA 02/17/2020 < OR = 5.0  5.0 - 8.0 Final    Glucose, UA 02/17/2020 NEGATIVE  NEGATIVE Final    Bilirubin, UA 02/17/2020 NEGATIVE  NEGATIVE Final    Ketones, UA 02/17/2020 NEGATIVE  NEGATIVE Final    Occult Blood UA 02/17/2020 NEGATIVE  NEGATIVE Final    Protein, UA 02/17/2020 NEGATIVE  NEGATIVE Final    Nitrite, UA 02/17/2020 NEGATIVE  NEGATIVE Final    Leukocytes, UA 02/17/2020 NEGATIVE  NEGATIVE Final     WBC Casts, UA 02/17/2020 NONE SEEN  < OR = 5 /HPF Final    RBC Casts, UA 02/17/2020 0-2  < OR = 2 /HPF Final    Squam Epithel, UA 02/17/2020 NONE SEEN  < OR = 5 /HPF Final    Bacteria, UA 02/17/2020 NONE SEEN  NONE SEEN /HPF Final    Hyaline Casts, UA 02/17/2020 NONE SEEN  NONE SEEN /LPF Final    Reflexive Urine Culture 02/17/2020 NO CULTURE INDICATED   Final       Past Medical History:   Diagnosis Date    Abdominal weakness 6/27/2018    Cancer     Prostate    Heartburn     Hypertension     Prostatic cancer 8/26/2016     Past Surgical History:   Procedure Laterality Date    PROSTATE BIOPSY  2015     Family History   Problem Relation Age of Onset    Lung cancer Father     Multiple sclerosis Sister        Marital Status:   Alcohol History:  reports that he does not drink alcohol.  Tobacco History:  reports that he has never smoked. He does not have any smokeless tobacco history on file.  Drug History:  reports that he does not use drugs.    Review of patient's allergies indicates:   Allergen Reactions    Codeine     Vardenafil        Current Outpatient Medications:     atorvastatin (LIPITOR) 40 MG tablet, Take 1 tablet (40 mg total) by mouth once daily., Disp: 90 tablet, Rfl: 1    biotin 10,000 mcg Cap, Take by mouth., Disp: , Rfl:     cholecalciferol, vitamin D3, (VITAMIN D3) 1,000 unit capsule, Take 1,000 Units by mouth once daily., Disp: , Rfl:     fish oil-omega-3 fatty acids 300-1,000 mg capsule, Take 2 g by mouth once daily., Disp: , Rfl:     lisinopril (PRINIVIL,ZESTRIL) 20 MG tablet, Take 1 tablet (20 mg total) by mouth once daily., Disp: 90 tablet, Rfl: 1    Review of Systems   Constitutional: Negative for appetite change, chills, fatigue, fever and unexpected weight change.   HENT: Positive for hearing loss (He is deaf but he reads lips well). Negative for congestion, ear pain and trouble swallowing.    Eyes: Negative for pain, discharge and visual disturbance.   Respiratory:  "Negative for apnea, cough, shortness of breath and wheezing.    Cardiovascular: Negative for chest pain and leg swelling.   Gastrointestinal: Negative for abdominal pain, blood in stool, constipation, diarrhea, nausea and vomiting.   Endocrine: Negative for cold intolerance, heat intolerance and polydipsia.   Genitourinary: Negative for dysuria, hematuria, testicular pain and urgency.        Voids well   Musculoskeletal: Negative for gait problem, joint swelling and myalgias.   Neurological: Negative for dizziness, seizures and numbness.   Psychiatric/Behavioral: Negative for agitation, behavioral problems and hallucinations. The patient is not nervous/anxious.           Objective:      Vitals:    02/28/20 0823   BP: 124/70   Pulse: 64   Weight: 78 kg (172 lb)   Height: 5' 10" (1.778 m)     Body mass index is 24.68 kg/m².  Physical Exam   Constitutional: He is oriented to person, place, and time. He appears well-developed and well-nourished.   Elderly deaf male in no apparent distress.  A  is present during this exam.   HENT:   Head: Normocephalic and atraumatic.   Right Ear: External ear normal.   Left Ear: External ear normal.   Nose: Nose normal.   Mouth/Throat: Oropharynx is clear and moist.   Patient is deaf   Eyes: Pupils are equal, round, and reactive to light. EOM are normal.   Neck: Normal range of motion. Neck supple. Carotid bruit is not present. No thyromegaly present.   Cardiovascular: Normal rate, regular rhythm, normal heart sounds and intact distal pulses.   No murmur heard.  Pulmonary/Chest: Effort normal and breath sounds normal. He has no wheezes. He has no rales.   Abdominal: Soft. Bowel sounds are normal. He exhibits no distension. There is no hepatosplenomegaly. There is no tenderness.   Musculoskeletal: Normal range of motion. He exhibits no tenderness or deformity.        Lumbar back: Normal. He exhibits no pain and no spasm.   Bends 90 degrees at  waist, shoulders " and knees have full range of motion.  No crepitance noted.  No peripheral edema is noted.  He had walks with a normal gait   Lymphadenopathy:     He has no cervical adenopathy.   Neurological: He is alert and oriented to person, place, and time. No cranial nerve deficit. Coordination normal.   Skin: Skin is warm and dry. No rash noted.   Has a healed lumbar midline scar from prior surgery.  He has multiple seborrheic keratoses to his arms and trunk   Psychiatric: He has a normal mood and affect. His behavior is normal. Judgment and thought content normal.   Nursing note and vitals reviewed.        Assessment:       1. Essential hypertension    2. Mixed hyperlipidemia    3. History of prostate cancer    4. Right sciatic nerve pain    5. Trigger ring finger of right hand    6. Bilateral deafness         Plan:       Essential hypertension  -     lisinopril (PRINIVIL,ZESTRIL) 20 MG tablet; Take 1 tablet (20 mg total) by mouth once daily.  Dispense: 90 tablet; Refill: 1  Blood pressure well controlled on current regimen  Mixed hyperlipidemia  -     atorvastatin (LIPITOR) 40 MG tablet; Take 1 tablet (40 mg total) by mouth once daily.  Dispense: 90 tablet; Refill: 1  -     Lipid panel; Future; Expected date: 08/28/2020  -     Comprehensive metabolic panel; Future; Expected date: 08/28/2020  Cholesterol is at goal on current atorvastatin  History of prostate cancer  Had prostatectomy 2015  Right sciatic nerve pain  Intermittent and not very symptomatic today  Trigger ring finger of right hand  Minor symptoms and he does not want surgery at this time  Bilateral deafness   was present during this exam    Follow up in about 6 months (around 8/28/2020) for Cholesterol.

## 2020-02-28 NOTE — TELEPHONE ENCOUNTER
----- Message from Lena Whitt sent at 2/28/2020 10:35 AM CST -----  Patient has a next appointment on august 31st with leander at 10:20

## 2020-06-02 ENCOUNTER — TELEPHONE (OUTPATIENT)
Dept: FAMILY MEDICINE | Facility: CLINIC | Age: 75
End: 2020-06-02

## 2020-06-02 NOTE — TELEPHONE ENCOUNTER
----- Message from aTlia Isabel sent at 6/2/2020  2:03 PM CDT -----  Contact: Amy pt's wife  Pt is supposed to be getting his eye surgery on 06/16th and she would like to know can she  the clearance papers tomorrow please. She dropped off the clearance paper yesterday.   Katelyn's# [phone]

## 2020-06-03 NOTE — TELEPHONE ENCOUNTER
LMOR letting pt wife know we faxed surgery clearance papers and that she is more than welcome to come  a copy

## 2020-08-07 ENCOUNTER — OFFICE VISIT (OUTPATIENT)
Dept: FAMILY MEDICINE | Facility: CLINIC | Age: 75
End: 2020-08-07
Payer: MEDICARE

## 2020-08-07 ENCOUNTER — TELEPHONE (OUTPATIENT)
Dept: FAMILY MEDICINE | Facility: CLINIC | Age: 75
End: 2020-08-07

## 2020-08-07 VITALS
WEIGHT: 170 LBS | HEIGHT: 70 IN | SYSTOLIC BLOOD PRESSURE: 146 MMHG | DIASTOLIC BLOOD PRESSURE: 84 MMHG | TEMPERATURE: 100 F | BODY MASS INDEX: 24.34 KG/M2 | HEART RATE: 74 BPM

## 2020-08-07 DIAGNOSIS — M25.561 ACUTE PAIN OF RIGHT KNEE: Primary | ICD-10-CM

## 2020-08-07 DIAGNOSIS — S89.91XA INJURY OF RIGHT KNEE, INITIAL ENCOUNTER: ICD-10-CM

## 2020-08-07 PROCEDURE — 3077F PR MOST RECENT SYSTOLIC BLOOD PRESSURE >= 140 MM HG: ICD-10-PCS | Mod: S$GLB,,, | Performed by: NURSE PRACTITIONER

## 2020-08-07 PROCEDURE — 99213 OFFICE O/P EST LOW 20 MIN: CPT | Mod: S$GLB,,, | Performed by: NURSE PRACTITIONER

## 2020-08-07 PROCEDURE — 1159F PR MEDICATION LIST DOCUMENTED IN MEDICAL RECORD: ICD-10-PCS | Mod: S$GLB,,, | Performed by: NURSE PRACTITIONER

## 2020-08-07 PROCEDURE — 1101F PR PT FALLS ASSESS DOC 0-1 FALLS W/OUT INJ PAST YR: ICD-10-PCS | Mod: S$GLB,,, | Performed by: NURSE PRACTITIONER

## 2020-08-07 PROCEDURE — 3079F DIAST BP 80-89 MM HG: CPT | Mod: S$GLB,,, | Performed by: NURSE PRACTITIONER

## 2020-08-07 PROCEDURE — 3008F BODY MASS INDEX DOCD: CPT | Mod: S$GLB,,, | Performed by: NURSE PRACTITIONER

## 2020-08-07 PROCEDURE — 99213 PR OFFICE/OUTPT VISIT, EST, LEVL III, 20-29 MIN: ICD-10-PCS | Mod: S$GLB,,, | Performed by: NURSE PRACTITIONER

## 2020-08-07 PROCEDURE — 3079F PR MOST RECENT DIASTOLIC BLOOD PRESSURE 80-89 MM HG: ICD-10-PCS | Mod: S$GLB,,, | Performed by: NURSE PRACTITIONER

## 2020-08-07 PROCEDURE — 1159F MED LIST DOCD IN RCRD: CPT | Mod: S$GLB,,, | Performed by: NURSE PRACTITIONER

## 2020-08-07 PROCEDURE — 1101F PT FALLS ASSESS-DOCD LE1/YR: CPT | Mod: S$GLB,,, | Performed by: NURSE PRACTITIONER

## 2020-08-07 PROCEDURE — 3008F PR BODY MASS INDEX (BMI) DOCUMENTED: ICD-10-PCS | Mod: S$GLB,,, | Performed by: NURSE PRACTITIONER

## 2020-08-07 PROCEDURE — 3077F SYST BP >= 140 MM HG: CPT | Mod: S$GLB,,, | Performed by: NURSE PRACTITIONER

## 2020-08-07 RX ORDER — TRAMADOL HYDROCHLORIDE 50 MG/1
50 TABLET ORAL EVERY 6 HOURS PRN
Qty: 20 EACH | Refills: 0 | Status: SHIPPED | OUTPATIENT
Start: 2020-08-07 | End: 2020-08-12

## 2020-08-07 RX ORDER — NEOMYCIN SULFATE, POLYMYXIN B SULFATE AND DEXAMETHASONE 3.5; 10000; 1 MG/ML; [USP'U]/ML; MG/ML
SUSPENSION/ DROPS OPHTHALMIC
COMMUNITY
Start: 2020-06-24 | End: 2021-02-25

## 2020-08-07 RX ORDER — MAGNESIUM 250 MG
TABLET ORAL ONCE
COMMUNITY

## 2020-08-07 RX ORDER — NEOMYCIN SULFATE, POLYMYXIN B SULFATE, AND DEXAMETHASONE 3.5; 10000; 1 MG/G; [USP'U]/G; MG/G
OINTMENT OPHTHALMIC
COMMUNITY
Start: 2020-06-23 | End: 2021-02-25

## 2020-08-07 NOTE — PROGRESS NOTES
SUBJECTIVE:    Patient ID: Robert Jessica is a 75 y.o. male.    Chief Complaint: Knee Pain (right//started wednesday//brought bottles tb ) and Swelling (right knee tb )    Pt presents with c/o right knee pain and swelling for the last 2 days. Works in [employer] shop and is on feet all day. Was walking to car at the end of the day and felt his knee give. Woke up the next day with lots of pain that improved after walking for a while. Then stayed home yesterday due to the pain. Pain with walking. Some swelling around the meniscus. Able to bear weight.       Telephone on 02/12/2020   Component Date Value Ref Range Status    WBC 02/17/2020 4.6  3.8 - 10.8 Thousand/uL Final    RBC 02/17/2020 4.51  4.20 - 5.80 Million/uL Final    Hemoglobin 02/17/2020 13.8  13.2 - 17.1 g/dL Final    Hematocrit 02/17/2020 40.9  38.5 - 50.0 % Final    Mean Corpuscular Volume 02/17/2020 90.7  80.0 - 100.0 fL Final    Mean Corpuscular Hemoglobin 02/17/2020 30.6  27.0 - 33.0 pg Final    Mean Corpuscular Hemoglobin Conc 02/17/2020 33.7  32.0 - 36.0 g/dL Final    RDW 02/17/2020 12.2  11.0 - 15.0 % Final    Platelets 02/17/2020 258  140 - 400 Thousand/uL Final    MPV 02/17/2020 9.4  7.5 - 12.5 fL Final    Neutrophils Absolute 02/17/2020 2,337  1,500 - 7,800 cells/uL Final    Lymph # 02/17/2020 1,371  850 - 3,900 cells/uL Final    Mono # 02/17/2020 501  200 - 950 cells/uL Final    Eos # 02/17/2020 350  15 - 500 cells/uL Final    Baso # 02/17/2020 41  0 - 200 cells/uL Final    Neutrophils Relative 02/17/2020 50.8  % Final    Lymph% 02/17/2020 29.8  % Final    Mono% 02/17/2020 10.9  % Final    Eosinophil% 02/17/2020 7.6  % Final    Basophil% 02/17/2020 0.9  % Final    Glucose 02/17/2020 109* 65 - 99 mg/dL Final    BUN, Bld 02/17/2020 22  7 - 25 mg/dL Final    Creatinine 02/17/2020 1.00  0.70 - 1.18 mg/dL Final    eGFR if non African American 02/17/2020 75  > OR = 60 mL/min/1.73m2 Final    eGFR if  02/17/2020  87  > OR = 60 mL/min/1.73m2 Final    BUN/Creatinine Ratio 02/17/2020 NOT APPLICABLE  6 - 22 (calc) Final    Sodium 02/17/2020 139  135 - 146 mmol/L Final    Potassium 02/17/2020 4.3  3.5 - 5.3 mmol/L Final    Chloride 02/17/2020 103  98 - 110 mmol/L Final    CO2 02/17/2020 29  20 - 32 mmol/L Final    Calcium 02/17/2020 9.6  8.6 - 10.3 mg/dL Final    Total Protein 02/17/2020 7.0  6.1 - 8.1 g/dL Final    Albumin 02/17/2020 4.5  3.6 - 5.1 g/dL Final    Globulin, Total 02/17/2020 2.5  1.9 - 3.7 g/dL (calc) Final    Albumin/Globulin Ratio 02/17/2020 1.8  1.0 - 2.5 (calc) Final    Total Bilirubin 02/17/2020 0.6  0.2 - 1.2 mg/dL Final    Alkaline Phosphatase 02/17/2020 56  35 - 144 U/L Final    AST 02/17/2020 20  10 - 35 U/L Final    ALT 02/17/2020 26  9 - 46 U/L Final    Cholesterol 02/17/2020 166  <200 mg/dL Final    HDL 02/17/2020 52  > OR = 40 mg/dL Final    Triglycerides 02/17/2020 70  <150 mg/dL Final    LDL Cholesterol 02/17/2020 98  mg/dL (calc) Final    Hdl/Cholesterol Ratio 02/17/2020 3.2  <5.0 (calc) Final    Non HDL Chol. (LDL+VLDL) 02/17/2020 114  <130 mg/dL (calc) Final    TSH 02/17/2020 0.88  0.40 - 4.50 mIU/L Final    Creatinine, Random Ur 02/17/2020 200  20 - 320 mg/dL Final    Microalb, Ur 02/17/2020 0.5  See Note: mg/dL Final    Microalb Creat Ratio 02/17/2020 3  <30 mcg/mg creat Final    Color, UA 02/17/2020 YELLOW  YELLOW Final    Appearance, UA 02/17/2020 CLEAR  CLEAR Final    Specific Gravity, UA 02/17/2020 1.032  1.001 - 1.035 Final    pH, UA 02/17/2020 < OR = 5.0  5.0 - 8.0 Final    Glucose, UA 02/17/2020 NEGATIVE  NEGATIVE Final    Bilirubin, UA 02/17/2020 NEGATIVE  NEGATIVE Final    Ketones, UA 02/17/2020 NEGATIVE  NEGATIVE Final    Occult Blood UA 02/17/2020 NEGATIVE  NEGATIVE Final    Protein, UA 02/17/2020 NEGATIVE  NEGATIVE Final    Nitrite, UA 02/17/2020 NEGATIVE  NEGATIVE Final    Leukocytes, UA 02/17/2020 NEGATIVE  NEGATIVE Final    WBC Casts, UA  02/17/2020 NONE SEEN  < OR = 5 /HPF Final    RBC Casts, UA 02/17/2020 0-2  < OR = 2 /HPF Final    Squam Epithel, UA 02/17/2020 NONE SEEN  < OR = 5 /HPF Final    Bacteria, UA 02/17/2020 NONE SEEN  NONE SEEN /HPF Final    Hyaline Casts, UA 02/17/2020 NONE SEEN  NONE SEEN /LPF Final    Reflexive Urine Culture 02/17/2020 NO CULTURE INDICATED   Final       Past Medical History:   Diagnosis Date    Abdominal weakness 6/27/2018    Cancer     Prostate    Heartburn     Hypertension     Prostatic cancer 8/26/2016     Past Surgical History:   Procedure Laterality Date    PROSTATE BIOPSY  2015     Family History   Problem Relation Age of Onset    Lung cancer Father     Multiple sclerosis Sister        Marital Status:   Alcohol History:  reports no history of alcohol use.  Tobacco History:  reports that he has never smoked. He does not have any smokeless tobacco history on file.  Drug History:  reports no history of drug use.    Review of patient's allergies indicates:   Allergen Reactions    Codeine     Vardenafil        Current Outpatient Medications:     atorvastatin (LIPITOR) 40 MG tablet, Take 1 tablet (40 mg total) by mouth once daily., Disp: 90 tablet, Rfl: 1    cholecalciferol, vitamin D3, (VITAMIN D3) 1,000 unit capsule, Take 1,000 Units by mouth once daily., Disp: , Rfl:     lisinopril (PRINIVIL,ZESTRIL) 20 MG tablet, Take 1 tablet (20 mg total) by mouth once daily., Disp: 90 tablet, Rfl: 1    magnesium 250 mg Tab, Take by mouth once., Disp: , Rfl:     multivit-min/ferrous fumarate (MULTI VITAMIN ORAL), Take by mouth., Disp: , Rfl:     vit A/C/E ac/ZnOx/cupric oxide (EYE VITAMIN AND MINERALS ORAL), Take by mouth., Disp: , Rfl:     biotin 10,000 mcg Cap, Take by mouth., Disp: , Rfl:     fish oil-omega-3 fatty acids 300-1,000 mg capsule, Take 2 g by mouth once daily., Disp: , Rfl:     neomycin-polymyxin-dexamethasone (DEXACINE) 3.5 mg/g-10,000 unit/g-0.1 % Oint, APPLY 1 INCH TO LEFT LOWER  "LID FOUR TIMES DAILY, Disp: , Rfl:     neomycin-polymyxin-dexamethasone (MAXITROL) 3.5mg/mL-10,000 unit/mL-0.1 % DrpS, INSTILL 1 DROP INTO LEFT EYE 4 TIMES DAILY AS DIRECTED, Disp: , Rfl:     traMADoL (ULTRAM) 50 mg tablet, Take 1 tablet (50 mg total) by mouth every 6 (six) hours as needed for Pain., Disp: 20 each, Rfl: 0    Review of Systems   Respiratory: Negative.    Cardiovascular: Negative.    Musculoskeletal: Positive for arthralgias. Negative for back pain.   Neurological: Negative.    All other systems reviewed and are negative.         Objective:      Vitals:    08/07/20 1114 08/07/20 1116   BP: (!) 150/84 (!) 146/84   Pulse: 74    Temp: 99.7 °F (37.6 °C)    Weight: 77.1 kg (170 lb)    Height: 5' 10" (1.778 m)      Body mass index is 24.39 kg/m².  Physical Exam  Constitutional:       General: He is not in acute distress.     Appearance: Normal appearance.   Cardiovascular:      Rate and Rhythm: Normal rate and regular rhythm.   Pulmonary:      Effort: Pulmonary effort is normal.      Breath sounds: Normal breath sounds.   Musculoskeletal:      Right knee: He exhibits swelling. He exhibits normal alignment, no LCL laxity and normal patellar mobility. Tenderness found.   Skin:     Capillary Refill: Capillary refill takes less than 2 seconds.   Neurological:      Mental Status: He is alert.   Psychiatric:         Mood and Affect: Mood normal.           Assessment:       1. Acute pain of right knee    2. Injury of right knee, initial encounter         Plan:       Acute pain of right knee  -     traMADoL (ULTRAM) 50 mg tablet; Take 1 tablet (50 mg total) by mouth every 6 (six) hours as needed for Pain.  Dispense: 20 each; Refill: 0    Injury of right knee, initial encounter  - Advised RICE for now. Will re-assess Monday and consider Ortho referral.    Follow up if symptoms worsen or fail to improve.              "

## 2020-08-07 NOTE — TELEPHONE ENCOUNTER
----- Message from Kaylen Dominguez sent at 8/7/2020 10:30 AM CDT -----  Pt's wife called said the pt hurt his knee and its now swollen after a pop when walking.  Wanting to be seen today. Arvind Maza scheduled pt for 11:00 today with Lisa.  Nurse aware pt will need gera.

## 2020-08-10 ENCOUNTER — TELEPHONE (OUTPATIENT)
Dept: FAMILY MEDICINE | Facility: CLINIC | Age: 75
End: 2020-08-10

## 2020-08-10 NOTE — TELEPHONE ENCOUNTER
----- Message from Kaylen Dominguez sent at 8/10/2020 11:06 AM CDT -----  Pt calling says his knee is doing fine and he does not feel he needs a call or to be seen.  If we think otherwise to call.    [phone]

## 2020-08-18 ENCOUNTER — TELEPHONE (OUTPATIENT)
Dept: FAMILY MEDICINE | Facility: CLINIC | Age: 75
End: 2020-08-18

## 2020-08-18 NOTE — TELEPHONE ENCOUNTER
From overdue results-lab due prior to visit. Spoke to patient that fasting lab is due prior to office visit.

## 2020-08-22 LAB
ALBUMIN SERPL-MCNC: 4.2 G/DL (ref 3.6–5.1)
ALBUMIN/GLOB SERPL: 1.9 (CALC) (ref 1–2.5)
ALP SERPL-CCNC: 55 U/L (ref 35–144)
ALT SERPL-CCNC: 20 U/L (ref 9–46)
APPEARANCE UR: CLEAR
AST SERPL-CCNC: 17 U/L (ref 10–35)
BACTERIA #/AREA URNS HPF: NORMAL /HPF
BACTERIA UR CULT: NORMAL
BILIRUB SERPL-MCNC: 0.5 MG/DL (ref 0.2–1.2)
BILIRUB UR QL STRIP: NEGATIVE
BUN SERPL-MCNC: 16 MG/DL (ref 7–25)
BUN/CREAT SERPL: ABNORMAL (CALC) (ref 6–22)
CALCIUM SERPL-MCNC: 9.8 MG/DL (ref 8.6–10.3)
CHLORIDE SERPL-SCNC: 103 MMOL/L (ref 98–110)
CHOLEST SERPL-MCNC: 144 MG/DL
CHOLEST/HDLC SERPL: 3 (CALC)
CO2 SERPL-SCNC: 32 MMOL/L (ref 20–32)
COLOR UR: YELLOW
CREAT SERPL-MCNC: 0.97 MG/DL (ref 0.7–1.18)
GFRSERPLBLD MDRD-ARVRAT: 77 ML/MIN/1.73M2
GLOBULIN SER CALC-MCNC: 2.2 G/DL (CALC) (ref 1.9–3.7)
GLUCOSE SERPL-MCNC: 104 MG/DL (ref 65–99)
GLUCOSE UR QL STRIP: NEGATIVE
HDLC SERPL-MCNC: 48 MG/DL
HGB UR QL STRIP: NEGATIVE
HYALINE CASTS #/AREA URNS LPF: NORMAL /LPF
KETONES UR QL STRIP: NEGATIVE
LDLC SERPL CALC-MCNC: 81 MG/DL (CALC)
LEUKOCYTE ESTERASE UR QL STRIP: NEGATIVE
NITRITE UR QL STRIP: NEGATIVE
NONHDLC SERPL-MCNC: 96 MG/DL (CALC)
PH UR STRIP: 5.5 [PH] (ref 5–8)
POTASSIUM SERPL-SCNC: 4.4 MMOL/L (ref 3.5–5.3)
PROT SERPL-MCNC: 6.4 G/DL (ref 6.1–8.1)
PROT UR QL STRIP: NEGATIVE
RBC #/AREA URNS HPF: NORMAL /HPF
SODIUM SERPL-SCNC: 140 MMOL/L (ref 135–146)
SP GR UR STRIP: 1.02 (ref 1–1.03)
SQUAMOUS #/AREA URNS HPF: NORMAL /HPF
TRIGL SERPL-MCNC: 72 MG/DL
WBC #/AREA URNS HPF: NORMAL /HPF

## 2020-08-31 ENCOUNTER — OFFICE VISIT (OUTPATIENT)
Dept: FAMILY MEDICINE | Facility: CLINIC | Age: 75
End: 2020-08-31
Payer: MEDICARE

## 2020-08-31 VITALS
HEART RATE: 84 BPM | TEMPERATURE: 98 F | WEIGHT: 172 LBS | BODY MASS INDEX: 24.62 KG/M2 | HEIGHT: 70 IN | SYSTOLIC BLOOD PRESSURE: 134 MMHG | DIASTOLIC BLOOD PRESSURE: 60 MMHG

## 2020-08-31 DIAGNOSIS — K42.9 UMBILICAL HERNIA WITHOUT OBSTRUCTION AND WITHOUT GANGRENE: ICD-10-CM

## 2020-08-31 DIAGNOSIS — Z85.46 HISTORY OF PROSTATE CANCER: ICD-10-CM

## 2020-08-31 DIAGNOSIS — E78.2 MIXED HYPERLIPIDEMIA: ICD-10-CM

## 2020-08-31 DIAGNOSIS — I10 ESSENTIAL HYPERTENSION: Primary | ICD-10-CM

## 2020-08-31 DIAGNOSIS — S83.411D SPRAIN OF MEDIAL COLLATERAL LIGAMENT OF RIGHT KNEE, SUBSEQUENT ENCOUNTER: ICD-10-CM

## 2020-08-31 DIAGNOSIS — H91.93 BILATERAL DEAFNESS: ICD-10-CM

## 2020-08-31 PROCEDURE — 1101F PT FALLS ASSESS-DOCD LE1/YR: CPT | Mod: S$GLB,,, | Performed by: FAMILY MEDICINE

## 2020-08-31 PROCEDURE — 99214 PR OFFICE/OUTPT VISIT, EST, LEVL IV, 30-39 MIN: ICD-10-PCS | Mod: S$GLB,,, | Performed by: FAMILY MEDICINE

## 2020-08-31 PROCEDURE — 1159F MED LIST DOCD IN RCRD: CPT | Mod: S$GLB,,, | Performed by: FAMILY MEDICINE

## 2020-08-31 PROCEDURE — 1101F PR PT FALLS ASSESS DOC 0-1 FALLS W/OUT INJ PAST YR: ICD-10-PCS | Mod: S$GLB,,, | Performed by: FAMILY MEDICINE

## 2020-08-31 PROCEDURE — 3008F BODY MASS INDEX DOCD: CPT | Mod: S$GLB,,, | Performed by: FAMILY MEDICINE

## 2020-08-31 PROCEDURE — 1159F PR MEDICATION LIST DOCUMENTED IN MEDICAL RECORD: ICD-10-PCS | Mod: S$GLB,,, | Performed by: FAMILY MEDICINE

## 2020-08-31 PROCEDURE — 3078F DIAST BP <80 MM HG: CPT | Mod: S$GLB,,, | Performed by: FAMILY MEDICINE

## 2020-08-31 PROCEDURE — 99214 OFFICE O/P EST MOD 30 MIN: CPT | Mod: S$GLB,,, | Performed by: FAMILY MEDICINE

## 2020-08-31 PROCEDURE — 3075F SYST BP GE 130 - 139MM HG: CPT | Mod: S$GLB,,, | Performed by: FAMILY MEDICINE

## 2020-08-31 PROCEDURE — 3008F PR BODY MASS INDEX (BMI) DOCUMENTED: ICD-10-PCS | Mod: S$GLB,,, | Performed by: FAMILY MEDICINE

## 2020-08-31 PROCEDURE — 3078F PR MOST RECENT DIASTOLIC BLOOD PRESSURE < 80 MM HG: ICD-10-PCS | Mod: S$GLB,,, | Performed by: FAMILY MEDICINE

## 2020-08-31 PROCEDURE — 3075F PR MOST RECENT SYSTOLIC BLOOD PRESS GE 130-139MM HG: ICD-10-PCS | Mod: S$GLB,,, | Performed by: FAMILY MEDICINE

## 2020-08-31 RX ORDER — LISINOPRIL 20 MG/1
20 TABLET ORAL DAILY
Qty: 90 TABLET | Refills: 1 | Status: SHIPPED | OUTPATIENT
Start: 2020-08-31 | End: 2021-02-25 | Stop reason: SDUPTHER

## 2020-08-31 RX ORDER — ATORVASTATIN CALCIUM 40 MG/1
40 TABLET, FILM COATED ORAL DAILY
Qty: 90 TABLET | Refills: 1 | Status: SHIPPED | OUTPATIENT
Start: 2020-08-31 | End: 2021-02-25 | Stop reason: SDUPTHER

## 2020-08-31 NOTE — PROGRESS NOTES
SUBJECTIVE:    Patient ID: Robert Jessica is a 75 y.o. male.    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  He does have some shortness of breath with a walker Cross the parking lot to work but he feels fine the rest today.  He can mow the lawn and do chores around the house all day without getting chest pains or shortness of breath.    Three weeks ago he had a right knee twisting injury.  This was treated conservatively with rest ice and knee brace.  Tramadol was use short-term for pain but was a little sedating.    2016-prostatectomy for prostate carcinoma.  Recent PSA at his urologist was negative    2016-colonoscopy with Dr. Ordonez[facility] 10 years.      No visits with results within 6 Month(s) from this visit.   Latest known visit with results is:   Office Visit on 02/28/2020   Component Date Value Ref Range Status    Cholesterol 08/21/2020 144  <200 mg/dL Final    HDL 08/21/2020 48  > OR = 40 mg/dL Final    Triglycerides 08/21/2020 72  <150 mg/dL Final    LDL Cholesterol 08/21/2020 81  mg/dL (calc) Final    Hdl/Cholesterol Ratio 08/21/2020 3.0  <5.0 (calc) Final    Non HDL Chol. (LDL+VLDL) 08/21/2020 96  <130 mg/dL (calc) Final    Glucose 08/21/2020 104* 65 - 99 mg/dL Final    BUN, Bld 08/21/2020 16  7 - 25 mg/dL Final    Creatinine 08/21/2020 0.97  0.70 - 1.18 mg/dL Final    eGFR if non African American 08/21/2020 77  > OR = 60 mL/min/1.73m2 Final    eGFR if African American 08/21/2020 89  > OR = 60 mL/min/1.73m2 Final    BUN/Creatinine Ratio 08/21/2020 NOT APPLICABLE  6 - 22 (calc) Final    Sodium 08/21/2020 140  135 - 146 mmol/L Final    Potassium 08/21/2020 4.4  3.5 - 5.3 mmol/L Final    Chloride 08/21/2020 103  98 - 110 mmol/L Final    CO2 08/21/2020 32  20 - 32 mmol/L Final    Calcium 08/21/2020 9.8  8.6 - 10.3 mg/dL Final    Total Protein 08/21/2020 6.4  6.1 - 8.1 g/dL  Final    Albumin 08/21/2020 4.2  3.6 - 5.1 g/dL Final    Globulin, Total 08/21/2020 2.2  1.9 - 3.7 g/dL (calc) Final    Albumin/Globulin Ratio 08/21/2020 1.9  1.0 - 2.5 (calc) Final    Total Bilirubin 08/21/2020 0.5  0.2 - 1.2 mg/dL Final    Alkaline Phosphatase 08/21/2020 55  35 - 144 U/L Final    AST 08/21/2020 17  10 - 35 U/L Final    ALT 08/21/2020 20  9 - 46 U/L Final       Past Medical History:   Diagnosis Date    Abdominal weakness 6/27/2018    Cancer     Prostate    Heartburn     Hypertension     Prostatic cancer 8/26/2016     Social History     Socioeconomic History    Marital status:      Spouse name: Not on file    Number of children: Not on file    Years of education: Not on file    Highest education level: Not on file   Occupational History    Not on file   Social Needs    Financial resource strain: Not on file    Food insecurity     Worry: Not on file     Inability: Not on file    Transportation needs     Medical: Not on file     Non-medical: Not on file   Tobacco Use    Smoking status: Never Smoker   Substance and Sexual Activity    Alcohol use: No    Drug use: No    Sexual activity: Yes     Partners: Female   Lifestyle    Physical activity     Days per week: Not on file     Minutes per session: Not on file    Stress: Not on file   Relationships    Social connections     Talks on phone: Not on file     Gets together: Not on file     Attends Synagogue service: Not on file     Active member of club or organization: Not on file     Attends meetings of clubs or organizations: Not on file     Relationship status: Not on file   Other Topics Concern    Not on file   Social History Narrative    Not on file     Past Surgical History:   Procedure Laterality Date    PROSTATE BIOPSY  2015     Family History   Problem Relation Age of Onset    Lung cancer Father     Multiple sclerosis Sister        Review of patient's allergies indicates:   Allergen Reactions    Codeine      Vardenafil        Current Outpatient Medications:     atorvastatin (LIPITOR) 40 MG tablet, Take 1 tablet (40 mg total) by mouth once daily., Disp: 90 tablet, Rfl: 1    biotin 10,000 mcg Cap, Take by mouth., Disp: , Rfl:     cholecalciferol, vitamin D3, (VITAMIN D3) 1,000 unit capsule, Take 1,000 Units by mouth once daily., Disp: , Rfl:     fish oil-omega-3 fatty acids 300-1,000 mg capsule, Take 2 g by mouth once daily., Disp: , Rfl:     lisinopriL (PRINIVIL,ZESTRIL) 20 MG tablet, Take 1 tablet (20 mg total) by mouth once daily., Disp: 90 tablet, Rfl: 1    magnesium 250 mg Tab, Take by mouth once., Disp: , Rfl:     multivit-min/ferrous fumarate (MULTI VITAMIN ORAL), Take by mouth., Disp: , Rfl:     neomycin-polymyxin-dexamethasone (DEXACINE) 3.5 mg/g-10,000 unit/g-0.1 % Oint, APPLY 1 INCH TO LEFT LOWER LID FOUR TIMES DAILY, Disp: , Rfl:     neomycin-polymyxin-dexamethasone (MAXITROL) 3.5mg/mL-10,000 unit/mL-0.1 % DrpS, INSTILL 1 DROP INTO LEFT EYE 4 TIMES DAILY AS DIRECTED, Disp: , Rfl:     vit A/C/E ac/ZnOx/cupric oxide (EYE VITAMIN AND MINERALS ORAL), Take by mouth., Disp: , Rfl:     Review of Systems   Constitutional: Negative for appetite change, chills, fatigue, fever and unexpected weight change.   HENT: Positive for hearing loss (Patient is deaf and uses an ). Negative for congestion, ear pain and trouble swallowing.    Eyes: Negative for pain, discharge and visual disturbance.   Respiratory: Negative for apnea, cough, shortness of breath and wheezing.    Cardiovascular: Negative for chest pain and leg swelling.   Gastrointestinal: Negative for abdominal pain, blood in stool, constipation, diarrhea, nausea and vomiting.   Endocrine: Negative for cold intolerance, heat intolerance and polydipsia.   Genitourinary: Negative for dysuria, hematuria, testicular pain and urgency.   Musculoskeletal: Negative for gait problem, joint swelling and myalgias. Arthralgias: rt knee pain and swelling ,  "improved  w/ ice  and  knee brace.   Neurological: Negative for dizziness, seizures and numbness.   Psychiatric/Behavioral: Negative for agitation, behavioral problems and hallucinations. The patient is not nervous/anxious.           Objective:      Vitals:    08/31/20 1032   BP: 134/60   Pulse: 84   Temp: 98.4 °F (36.9 °C)   Weight: 78 kg (172 lb)   Height: 5' 10" (1.778 m)     Physical Exam  Vitals signs and nursing note reviewed.   Constitutional:       Appearance: He is well-developed.   HENT:      Head: Normocephalic and atraumatic.      Right Ear: External ear normal.      Left Ear: External ear normal.      Ears:      Comments: Patient is deaf and communicates through an      Nose: Nose normal.   Eyes:      Pupils: Pupils are equal, round, and reactive to light.   Neck:      Musculoskeletal: Normal range of motion and neck supple.      Thyroid: No thyromegaly.      Vascular: No carotid bruit.   Cardiovascular:      Rate and Rhythm: Normal rate and regular rhythm.      Heart sounds: Normal heart sounds. No murmur.   Pulmonary:      Effort: Pulmonary effort is normal.      Breath sounds: Normal breath sounds. No wheezing or rales.   Abdominal:      General: Bowel sounds are normal. There is no distension.      Palpations: Abdomen is soft.      Tenderness: There is no abdominal tenderness.      Hernia: A hernia (Small reducible umbilical hernia) is present.   Musculoskeletal: Normal range of motion.         General: No tenderness or deformity.      Lumbar back: Normal. He exhibits no pain and no spasm.      Comments: Bends 90 degrees at  waist, right knee has no swelling.  Full range of motion without crepitance.  He walks with no limp.   Lymphadenopathy:      Cervical: No cervical adenopathy.   Skin:     General: Skin is warm and dry.      Findings: No rash.   Neurological:      Mental Status: He is alert and oriented to person, place, and time.      Cranial Nerves: No cranial nerve deficit.      " Coordination: Coordination normal.   Psychiatric:         Behavior: Behavior normal.         Thought Content: Thought content normal.         Judgment: Judgment normal.           Assessment:       1. Essential hypertension    2. Mixed hyperlipidemia    3. Bilateral deafness    4. History of prostate cancer    5. Umbilical hernia without obstruction and without gangrene    6. Sprain of medial collateral ligament of right knee, subsequent encounter         Plan:       Essential hypertension  -     lisinopriL (PRINIVIL,ZESTRIL) 20 MG tablet; Take 1 tablet (20 mg total) by mouth once daily.  Dispense: 90 tablet; Refill: 1  Blood pressure well controlled with current regimen  Mixed hyperlipidemia  -     atorvastatin (LIPITOR) 40 MG tablet; Take 1 tablet (40 mg total) by mouth once daily.  Dispense: 90 tablet; Refill: 1  Cholesterol at goal, continue atorvastatin, repeat labs in 6 months  Bilateral deafness  Stable  History of prostate cancer  PSA less than 0.1  Umbilical hernia without obstruction and without gangrene  Asymptomatic  Sprain of medial collateral ligament of right knee, subsequent encounter  Healing nicely at this time  Patient declines flu vaccine and pneumonia vaccine  Follow up in about 6 months (around 2/28/2021).        8/31/2020 Henry Villaseñor

## 2021-02-18 ENCOUNTER — TELEPHONE (OUTPATIENT)
Dept: FAMILY MEDICINE | Facility: CLINIC | Age: 76
End: 2021-02-18

## 2021-02-18 DIAGNOSIS — Z00.00 ROUTINE GENERAL MEDICAL EXAMINATION AT A HEALTH CARE FACILITY: Primary | ICD-10-CM

## 2021-02-18 DIAGNOSIS — E78.2 MIXED HYPERLIPIDEMIA: ICD-10-CM

## 2021-02-18 DIAGNOSIS — I10 ESSENTIAL HYPERTENSION: ICD-10-CM

## 2021-02-18 DIAGNOSIS — Z79.899 ENCOUNTER FOR LONG-TERM (CURRENT) USE OF OTHER MEDICATIONS: ICD-10-CM

## 2021-02-20 LAB
ALBUMIN SERPL-MCNC: 4.7 G/DL (ref 3.6–5.1)
ALBUMIN/CREAT UR: 4 MCG/MG CREAT
ALBUMIN/GLOB SERPL: 2.2 (CALC) (ref 1–2.5)
ALP SERPL-CCNC: 51 U/L (ref 35–144)
ALT SERPL-CCNC: 24 U/L (ref 9–46)
APPEARANCE UR: CLEAR
AST SERPL-CCNC: 20 U/L (ref 10–35)
BACTERIA #/AREA URNS HPF: NORMAL /HPF
BACTERIA UR CULT: NORMAL
BASOPHILS # BLD AUTO: 41 CELLS/UL (ref 0–200)
BASOPHILS NFR BLD AUTO: 0.8 %
BILIRUB SERPL-MCNC: 0.6 MG/DL (ref 0.2–1.2)
BILIRUB UR QL STRIP: NEGATIVE
BUN SERPL-MCNC: 18 MG/DL (ref 7–25)
BUN/CREAT SERPL: ABNORMAL (CALC) (ref 6–22)
CALCIUM SERPL-MCNC: 9.7 MG/DL (ref 8.6–10.3)
CHLORIDE SERPL-SCNC: 101 MMOL/L (ref 98–110)
CHOLEST SERPL-MCNC: 145 MG/DL
CHOLEST/HDLC SERPL: 3 (CALC)
CO2 SERPL-SCNC: 33 MMOL/L (ref 20–32)
COLOR UR: YELLOW
CREAT SERPL-MCNC: 0.97 MG/DL (ref 0.7–1.18)
CREAT UR-MCNC: 223 MG/DL (ref 20–320)
EOSINOPHIL # BLD AUTO: 423 CELLS/UL (ref 15–500)
EOSINOPHIL NFR BLD AUTO: 8.3 %
ERYTHROCYTE [DISTWIDTH] IN BLOOD BY AUTOMATED COUNT: 12 % (ref 11–15)
GFRSERPLBLD MDRD-ARVRAT: 77 ML/MIN/1.73M2
GLOBULIN SER CALC-MCNC: 2.1 G/DL (CALC) (ref 1.9–3.7)
GLUCOSE SERPL-MCNC: 103 MG/DL (ref 65–99)
GLUCOSE UR QL STRIP: NEGATIVE
HCT VFR BLD AUTO: 39 % (ref 38.5–50)
HDLC SERPL-MCNC: 49 MG/DL
HGB BLD-MCNC: 13.3 G/DL (ref 13.2–17.1)
HGB UR QL STRIP: NEGATIVE
HYALINE CASTS #/AREA URNS LPF: NORMAL /LPF
KETONES UR QL STRIP: NEGATIVE
LDLC SERPL CALC-MCNC: 80 MG/DL (CALC)
LEUKOCYTE ESTERASE UR QL STRIP: NEGATIVE
LYMPHOCYTES # BLD AUTO: 1627 CELLS/UL (ref 850–3900)
LYMPHOCYTES NFR BLD AUTO: 31.9 %
MCH RBC QN AUTO: 31.1 PG (ref 27–33)
MCHC RBC AUTO-ENTMCNC: 34.1 G/DL (ref 32–36)
MCV RBC AUTO: 91.1 FL (ref 80–100)
MICROALBUMIN UR-MCNC: 0.8 MG/DL
MONOCYTES # BLD AUTO: 581 CELLS/UL (ref 200–950)
MONOCYTES NFR BLD AUTO: 11.4 %
NEUTROPHILS # BLD AUTO: 2428 CELLS/UL (ref 1500–7800)
NEUTROPHILS NFR BLD AUTO: 47.6 %
NITRITE UR QL STRIP: NEGATIVE
NONHDLC SERPL-MCNC: 96 MG/DL (CALC)
PH UR STRIP: 5.5 [PH] (ref 5–8)
PLATELET # BLD AUTO: 261 THOUSAND/UL (ref 140–400)
PMV BLD REES-ECKER: 9.7 FL (ref 7.5–12.5)
POTASSIUM SERPL-SCNC: 4.2 MMOL/L (ref 3.5–5.3)
PROT SERPL-MCNC: 6.8 G/DL (ref 6.1–8.1)
PROT UR QL STRIP: NEGATIVE
RBC # BLD AUTO: 4.28 MILLION/UL (ref 4.2–5.8)
RBC #/AREA URNS HPF: NORMAL /HPF
SODIUM SERPL-SCNC: 140 MMOL/L (ref 135–146)
SP GR UR STRIP: 1.03 (ref 1–1.03)
SQUAMOUS #/AREA URNS HPF: NORMAL /HPF
TRIGL SERPL-MCNC: 78 MG/DL
TSH SERPL-ACNC: 1 MIU/L (ref 0.4–4.5)
WBC # BLD AUTO: 5.1 THOUSAND/UL (ref 3.8–10.8)
WBC #/AREA URNS HPF: NORMAL /HPF

## 2021-02-22 ENCOUNTER — TELEPHONE (OUTPATIENT)
Dept: FAMILY MEDICINE | Facility: CLINIC | Age: 76
End: 2021-02-22

## 2021-02-23 ENCOUNTER — TELEPHONE (OUTPATIENT)
Dept: FAMILY MEDICINE | Facility: CLINIC | Age: 76
End: 2021-02-23

## 2021-02-25 ENCOUNTER — OFFICE VISIT (OUTPATIENT)
Dept: FAMILY MEDICINE | Facility: CLINIC | Age: 76
End: 2021-02-25
Payer: MEDICARE

## 2021-02-25 VITALS
SYSTOLIC BLOOD PRESSURE: 122 MMHG | HEART RATE: 88 BPM | WEIGHT: 169 LBS | HEIGHT: 70 IN | DIASTOLIC BLOOD PRESSURE: 74 MMHG | BODY MASS INDEX: 24.2 KG/M2

## 2021-02-25 DIAGNOSIS — E78.2 MIXED HYPERLIPIDEMIA: ICD-10-CM

## 2021-02-25 DIAGNOSIS — Z85.46 HISTORY OF PROSTATE CANCER: ICD-10-CM

## 2021-02-25 DIAGNOSIS — I10 ESSENTIAL HYPERTENSION: Primary | ICD-10-CM

## 2021-02-25 DIAGNOSIS — H91.93 BILATERAL DEAFNESS: ICD-10-CM

## 2021-02-25 PROCEDURE — 3008F PR BODY MASS INDEX (BMI) DOCUMENTED: ICD-10-PCS | Mod: S$GLB,,, | Performed by: FAMILY MEDICINE

## 2021-02-25 PROCEDURE — 3074F PR MOST RECENT SYSTOLIC BLOOD PRESSURE < 130 MM HG: ICD-10-PCS | Mod: S$GLB,,, | Performed by: FAMILY MEDICINE

## 2021-02-25 PROCEDURE — 3078F DIAST BP <80 MM HG: CPT | Mod: S$GLB,,, | Performed by: FAMILY MEDICINE

## 2021-02-25 PROCEDURE — 1159F MED LIST DOCD IN RCRD: CPT | Mod: S$GLB,,, | Performed by: FAMILY MEDICINE

## 2021-02-25 PROCEDURE — 99214 OFFICE O/P EST MOD 30 MIN: CPT | Mod: S$GLB,,, | Performed by: FAMILY MEDICINE

## 2021-02-25 PROCEDURE — 1159F PR MEDICATION LIST DOCUMENTED IN MEDICAL RECORD: ICD-10-PCS | Mod: S$GLB,,, | Performed by: FAMILY MEDICINE

## 2021-02-25 PROCEDURE — 99214 PR OFFICE/OUTPT VISIT, EST, LEVL IV, 30-39 MIN: ICD-10-PCS | Mod: S$GLB,,, | Performed by: FAMILY MEDICINE

## 2021-02-25 PROCEDURE — 3078F PR MOST RECENT DIASTOLIC BLOOD PRESSURE < 80 MM HG: ICD-10-PCS | Mod: S$GLB,,, | Performed by: FAMILY MEDICINE

## 2021-02-25 PROCEDURE — 3008F BODY MASS INDEX DOCD: CPT | Mod: S$GLB,,, | Performed by: FAMILY MEDICINE

## 2021-02-25 PROCEDURE — 3074F SYST BP LT 130 MM HG: CPT | Mod: S$GLB,,, | Performed by: FAMILY MEDICINE

## 2021-02-25 RX ORDER — ATORVASTATIN CALCIUM 40 MG/1
40 TABLET, FILM COATED ORAL DAILY
Qty: 90 TABLET | Refills: 1 | Status: SHIPPED | OUTPATIENT
Start: 2021-02-25 | End: 2021-08-26 | Stop reason: SDUPTHER

## 2021-02-25 RX ORDER — LISINOPRIL 20 MG/1
20 TABLET ORAL DAILY
Qty: 90 TABLET | Refills: 1 | Status: SHIPPED | OUTPATIENT
Start: 2021-02-25 | End: 2021-08-26 | Stop reason: SDUPTHER

## 2021-03-05 ENCOUNTER — IMMUNIZATION (OUTPATIENT)
Dept: FAMILY MEDICINE | Facility: CLINIC | Age: 76
End: 2021-03-05
Payer: MEDICARE

## 2021-03-05 DIAGNOSIS — Z23 NEED FOR VACCINATION: Primary | ICD-10-CM

## 2021-03-05 PROCEDURE — 0001A COVID-19, MRNA, LNP-S, PF, 30 MCG/0.3 ML DOSE VACCINE: ICD-10-PCS | Mod: CV19,,, | Performed by: FAMILY MEDICINE

## 2021-03-05 PROCEDURE — 0001A COVID-19, MRNA, LNP-S, PF, 30 MCG/0.3 ML DOSE VACCINE: CPT | Mod: CV19,,, | Performed by: FAMILY MEDICINE

## 2021-03-05 PROCEDURE — 91300 COVID-19, MRNA, LNP-S, PF, 30 MCG/0.3 ML DOSE VACCINE: CPT | Mod: ,,, | Performed by: FAMILY MEDICINE

## 2021-03-05 PROCEDURE — 91300 COVID-19, MRNA, LNP-S, PF, 30 MCG/0.3 ML DOSE VACCINE: ICD-10-PCS | Mod: ,,, | Performed by: FAMILY MEDICINE

## 2021-03-26 ENCOUNTER — IMMUNIZATION (OUTPATIENT)
Dept: FAMILY MEDICINE | Facility: CLINIC | Age: 76
End: 2021-03-26
Payer: MEDICARE

## 2021-03-26 DIAGNOSIS — Z23 NEED FOR VACCINATION: Primary | ICD-10-CM

## 2021-03-26 PROCEDURE — 0002A COVID-19, MRNA, LNP-S, PF, 30 MCG/0.3 ML DOSE VACCINE: ICD-10-PCS | Mod: CV19,S$GLB,, | Performed by: FAMILY MEDICINE

## 2021-03-26 PROCEDURE — 0002A COVID-19, MRNA, LNP-S, PF, 30 MCG/0.3 ML DOSE VACCINE: CPT | Mod: CV19,S$GLB,, | Performed by: FAMILY MEDICINE

## 2021-03-26 PROCEDURE — 91300 COVID-19, MRNA, LNP-S, PF, 30 MCG/0.3 ML DOSE VACCINE: CPT | Mod: S$GLB,,, | Performed by: FAMILY MEDICINE

## 2021-03-26 PROCEDURE — 91300 COVID-19, MRNA, LNP-S, PF, 30 MCG/0.3 ML DOSE VACCINE: ICD-10-PCS | Mod: S$GLB,,, | Performed by: FAMILY MEDICINE

## 2021-08-23 ENCOUNTER — TELEPHONE (OUTPATIENT)
Dept: FAMILY MEDICINE | Facility: CLINIC | Age: 76
End: 2021-08-23

## 2021-08-26 ENCOUNTER — OFFICE VISIT (OUTPATIENT)
Dept: FAMILY MEDICINE | Facility: CLINIC | Age: 76
End: 2021-08-26
Payer: MEDICARE

## 2021-08-26 ENCOUNTER — TELEPHONE (OUTPATIENT)
Dept: FAMILY MEDICINE | Facility: CLINIC | Age: 76
End: 2021-08-26

## 2021-08-26 VITALS
HEART RATE: 68 BPM | HEIGHT: 70 IN | SYSTOLIC BLOOD PRESSURE: 110 MMHG | DIASTOLIC BLOOD PRESSURE: 60 MMHG | WEIGHT: 166 LBS | BODY MASS INDEX: 23.77 KG/M2

## 2021-08-26 DIAGNOSIS — I10 ESSENTIAL HYPERTENSION: ICD-10-CM

## 2021-08-26 DIAGNOSIS — Z85.46 HISTORY OF PROSTATE CANCER: ICD-10-CM

## 2021-08-26 DIAGNOSIS — H91.93 BILATERAL DEAFNESS: ICD-10-CM

## 2021-08-26 DIAGNOSIS — E78.2 MIXED HYPERLIPIDEMIA: Primary | ICD-10-CM

## 2021-08-26 PROCEDURE — 3288F FALL RISK ASSESSMENT DOCD: CPT | Mod: S$GLB,,, | Performed by: FAMILY MEDICINE

## 2021-08-26 PROCEDURE — 3008F BODY MASS INDEX DOCD: CPT | Mod: S$GLB,,, | Performed by: FAMILY MEDICINE

## 2021-08-26 PROCEDURE — 3078F PR MOST RECENT DIASTOLIC BLOOD PRESSURE < 80 MM HG: ICD-10-PCS | Mod: S$GLB,,, | Performed by: FAMILY MEDICINE

## 2021-08-26 PROCEDURE — 3008F PR BODY MASS INDEX (BMI) DOCUMENTED: ICD-10-PCS | Mod: S$GLB,,, | Performed by: FAMILY MEDICINE

## 2021-08-26 PROCEDURE — 3288F PR FALLS RISK ASSESSMENT DOCUMENTED: ICD-10-PCS | Mod: S$GLB,,, | Performed by: FAMILY MEDICINE

## 2021-08-26 PROCEDURE — 3074F SYST BP LT 130 MM HG: CPT | Mod: S$GLB,,, | Performed by: FAMILY MEDICINE

## 2021-08-26 PROCEDURE — 1101F PR PT FALLS ASSESS DOC 0-1 FALLS W/OUT INJ PAST YR: ICD-10-PCS | Mod: S$GLB,,, | Performed by: FAMILY MEDICINE

## 2021-08-26 PROCEDURE — 1159F PR MEDICATION LIST DOCUMENTED IN MEDICAL RECORD: ICD-10-PCS | Mod: S$GLB,,, | Performed by: FAMILY MEDICINE

## 2021-08-26 PROCEDURE — 3078F DIAST BP <80 MM HG: CPT | Mod: S$GLB,,, | Performed by: FAMILY MEDICINE

## 2021-08-26 PROCEDURE — 1101F PT FALLS ASSESS-DOCD LE1/YR: CPT | Mod: S$GLB,,, | Performed by: FAMILY MEDICINE

## 2021-08-26 PROCEDURE — 99214 PR OFFICE/OUTPT VISIT, EST, LEVL IV, 30-39 MIN: ICD-10-PCS | Mod: S$GLB,,, | Performed by: FAMILY MEDICINE

## 2021-08-26 PROCEDURE — 99214 OFFICE O/P EST MOD 30 MIN: CPT | Mod: S$GLB,,, | Performed by: FAMILY MEDICINE

## 2021-08-26 PROCEDURE — 1159F MED LIST DOCD IN RCRD: CPT | Mod: S$GLB,,, | Performed by: FAMILY MEDICINE

## 2021-08-26 PROCEDURE — 3074F PR MOST RECENT SYSTOLIC BLOOD PRESSURE < 130 MM HG: ICD-10-PCS | Mod: S$GLB,,, | Performed by: FAMILY MEDICINE

## 2021-08-26 RX ORDER — LISINOPRIL 20 MG/1
20 TABLET ORAL DAILY
Qty: 90 TABLET | Refills: 1 | Status: SHIPPED | OUTPATIENT
Start: 2021-08-26 | End: 2022-03-02 | Stop reason: SDUPTHER

## 2021-08-26 RX ORDER — ATORVASTATIN CALCIUM 40 MG/1
40 TABLET, FILM COATED ORAL DAILY
Qty: 90 TABLET | Refills: 1 | Status: SHIPPED | OUTPATIENT
Start: 2021-08-26 | End: 2022-03-02 | Stop reason: SDUPTHER

## 2021-08-27 LAB
ALBUMIN SERPL-MCNC: 4.5 G/DL (ref 3.6–5.1)
ALBUMIN/GLOB SERPL: 2 (CALC) (ref 1–2.5)
ALP SERPL-CCNC: 43 U/L (ref 35–144)
ALT SERPL-CCNC: 19 U/L (ref 9–46)
APPEARANCE UR: CLEAR
AST SERPL-CCNC: 17 U/L (ref 10–35)
BASOPHILS # BLD AUTO: 39 CELLS/UL (ref 0–200)
BASOPHILS NFR BLD AUTO: 0.8 %
BILIRUB SERPL-MCNC: 0.6 MG/DL (ref 0.2–1.2)
BILIRUB UR QL STRIP: NEGATIVE
BUN SERPL-MCNC: 18 MG/DL (ref 7–25)
BUN/CREAT SERPL: NORMAL (CALC) (ref 6–22)
CALCIUM SERPL-MCNC: 9.8 MG/DL (ref 8.6–10.3)
CHLORIDE SERPL-SCNC: 103 MMOL/L (ref 98–110)
CHOLEST SERPL-MCNC: 140 MG/DL
CHOLEST/HDLC SERPL: 2.7 (CALC)
CO2 SERPL-SCNC: 29 MMOL/L (ref 20–32)
COLOR UR: NORMAL
CREAT SERPL-MCNC: 0.92 MG/DL (ref 0.7–1.18)
EOSINOPHIL # BLD AUTO: 353 CELLS/UL (ref 15–500)
EOSINOPHIL NFR BLD AUTO: 7.2 %
ERYTHROCYTE [DISTWIDTH] IN BLOOD BY AUTOMATED COUNT: 12.1 % (ref 11–15)
GLOBULIN SER CALC-MCNC: 2.3 G/DL (CALC) (ref 1.9–3.7)
GLUCOSE SERPL-MCNC: 97 MG/DL (ref 65–139)
GLUCOSE UR QL STRIP: NEGATIVE
HCT VFR BLD AUTO: 36.3 % (ref 38.5–50)
HDLC SERPL-MCNC: 51 MG/DL
HGB BLD-MCNC: 12.5 G/DL (ref 13.2–17.1)
HGB UR QL STRIP: NEGATIVE
KETONES UR QL STRIP: NEGATIVE
LDLC SERPL CALC-MCNC: 71 MG/DL (CALC)
LEUKOCYTE ESTERASE UR QL STRIP: NEGATIVE
LYMPHOCYTES # BLD AUTO: 1563 CELLS/UL (ref 850–3900)
LYMPHOCYTES NFR BLD AUTO: 31.9 %
MCH RBC QN AUTO: 31.6 PG (ref 27–33)
MCHC RBC AUTO-ENTMCNC: 34.4 G/DL (ref 32–36)
MCV RBC AUTO: 91.7 FL (ref 80–100)
MONOCYTES # BLD AUTO: 564 CELLS/UL (ref 200–950)
MONOCYTES NFR BLD AUTO: 11.5 %
NEUTROPHILS # BLD AUTO: 2381 CELLS/UL (ref 1500–7800)
NEUTROPHILS NFR BLD AUTO: 48.6 %
NITRITE UR QL STRIP: NEGATIVE
NONHDLC SERPL-MCNC: 89 MG/DL (CALC)
PH UR STRIP: 6 [PH] (ref 5–8)
PLATELET # BLD AUTO: 238 THOUSAND/UL (ref 140–400)
PMV BLD REES-ECKER: 9.6 FL (ref 7.5–12.5)
POTASSIUM SERPL-SCNC: 4 MMOL/L (ref 3.5–5.3)
PROT SERPL-MCNC: 6.8 G/DL (ref 6.1–8.1)
PROT UR QL STRIP: NEGATIVE
RBC # BLD AUTO: 3.96 MILLION/UL (ref 4.2–5.8)
SODIUM SERPL-SCNC: 139 MMOL/L (ref 135–146)
SP GR UR STRIP: 1.02 (ref 1–1.03)
TRIGL SERPL-MCNC: 101 MG/DL
WBC # BLD AUTO: 4.9 THOUSAND/UL (ref 3.8–10.8)

## 2021-09-02 ENCOUNTER — TELEPHONE (OUTPATIENT)
Dept: FAMILY MEDICINE | Facility: CLINIC | Age: 76
End: 2021-09-02

## 2021-09-02 RX ORDER — IRON,CARBONYL/ASCORBIC ACID 65MG-125MG
TABLET, DELAYED RELEASE (ENTERIC COATED) ORAL
COMMUNITY

## 2021-09-08 ENCOUNTER — TELEPHONE (OUTPATIENT)
Dept: FAMILY MEDICINE | Facility: CLINIC | Age: 76
End: 2021-09-08

## 2021-09-09 ENCOUNTER — TELEPHONE (OUTPATIENT)
Dept: FAMILY MEDICINE | Facility: CLINIC | Age: 76
End: 2021-09-09

## 2021-12-07 ENCOUNTER — TELEPHONE (OUTPATIENT)
Dept: FAMILY MEDICINE | Facility: CLINIC | Age: 76
End: 2021-12-07
Payer: MEDICARE

## 2021-12-07 DIAGNOSIS — Z79.899 ENCOUNTER FOR LONG-TERM (CURRENT) USE OF OTHER MEDICATIONS: ICD-10-CM

## 2021-12-07 DIAGNOSIS — D64.9 ANEMIA: Primary | ICD-10-CM

## 2021-12-14 ENCOUNTER — TELEPHONE (OUTPATIENT)
Dept: FAMILY MEDICINE | Facility: CLINIC | Age: 76
End: 2021-12-14
Payer: MEDICARE

## 2021-12-21 ENCOUNTER — TELEPHONE (OUTPATIENT)
Dept: FAMILY MEDICINE | Facility: CLINIC | Age: 76
End: 2021-12-21
Payer: MEDICARE

## 2021-12-30 LAB
BASOPHILS # BLD AUTO: 39 CELLS/UL (ref 0–200)
BASOPHILS NFR BLD AUTO: 0.7 %
EOSINOPHIL # BLD AUTO: 440 CELLS/UL (ref 15–500)
EOSINOPHIL NFR BLD AUTO: 8 %
ERYTHROCYTE [DISTWIDTH] IN BLOOD BY AUTOMATED COUNT: 11.8 % (ref 11–15)
FERRITIN SERPL-MCNC: 159 NG/ML (ref 24–380)
HCT VFR BLD AUTO: 37.4 % (ref 38.5–50)
HGB BLD-MCNC: 12.4 G/DL (ref 13.2–17.1)
LYMPHOCYTES # BLD AUTO: 2079 CELLS/UL (ref 850–3900)
LYMPHOCYTES NFR BLD AUTO: 37.8 %
MCH RBC QN AUTO: 30.7 PG (ref 27–33)
MCHC RBC AUTO-ENTMCNC: 33.2 G/DL (ref 32–36)
MCV RBC AUTO: 92.6 FL (ref 80–100)
MONOCYTES # BLD AUTO: 649 CELLS/UL (ref 200–950)
MONOCYTES NFR BLD AUTO: 11.8 %
NEUTROPHILS # BLD AUTO: 2294 CELLS/UL (ref 1500–7800)
NEUTROPHILS NFR BLD AUTO: 41.7 %
PLATELET # BLD AUTO: 212 THOUSAND/UL (ref 140–400)
PMV BLD REES-ECKER: 9.5 FL (ref 7.5–12.5)
RBC # BLD AUTO: 4.04 MILLION/UL (ref 4.2–5.8)
WBC # BLD AUTO: 5.5 THOUSAND/UL (ref 3.8–10.8)

## 2022-01-03 ENCOUNTER — TELEPHONE (OUTPATIENT)
Dept: FAMILY MEDICINE | Facility: CLINIC | Age: 77
End: 2022-01-03
Payer: MEDICARE

## 2022-01-03 NOTE — TELEPHONE ENCOUNTER
----- Message from Henry Villaseñor MD sent at 1/1/2022  2:47 PM CST -----  Notify patient.  His anemia is improving.  He is only mildly anemic it with hematocrit of 37.4.  Iron levels are back to normal.  Okay to stop iron pills.

## 2022-02-17 ENCOUNTER — TELEPHONE (OUTPATIENT)
Dept: FAMILY MEDICINE | Facility: CLINIC | Age: 77
End: 2022-02-17
Payer: MEDICARE

## 2022-02-17 DIAGNOSIS — I10 ESSENTIAL HYPERTENSION: ICD-10-CM

## 2022-02-17 DIAGNOSIS — E78.2 MIXED HYPERLIPIDEMIA: ICD-10-CM

## 2022-02-17 DIAGNOSIS — Z79.899 ENCOUNTER FOR LONG-TERM (CURRENT) USE OF OTHER MEDICATIONS: Primary | ICD-10-CM

## 2022-02-17 NOTE — TELEPHONE ENCOUNTER
----- Message from Miya Mccullough sent at 2/17/2022  3:19 PM CST -----  Pt would like a in person interperter for his upcoming appt. And also would like to know if he needs any lab work before his appt. He stopped his iron 6 months ago.   [phone]

## 2022-02-24 ENCOUNTER — TELEPHONE (OUTPATIENT)
Dept: FAMILY MEDICINE | Facility: CLINIC | Age: 77
End: 2022-02-24
Payer: MEDICARE

## 2022-02-24 NOTE — TELEPHONE ENCOUNTER
----- Message from Kaylen Dominguez sent at 2/24/2022  3:16 PM CST -----  Pt's wife calling to confirm if an in person  has been arranged for the 3/2 appt.   [phone]

## 2022-03-02 ENCOUNTER — OFFICE VISIT (OUTPATIENT)
Dept: FAMILY MEDICINE | Facility: CLINIC | Age: 77
End: 2022-03-02
Payer: MEDICARE

## 2022-03-02 VITALS
WEIGHT: 167 LBS | DIASTOLIC BLOOD PRESSURE: 74 MMHG | BODY MASS INDEX: 23.91 KG/M2 | HEIGHT: 70 IN | HEART RATE: 72 BPM | SYSTOLIC BLOOD PRESSURE: 130 MMHG

## 2022-03-02 DIAGNOSIS — K42.9 UMBILICAL HERNIA WITHOUT OBSTRUCTION AND WITHOUT GANGRENE: ICD-10-CM

## 2022-03-02 DIAGNOSIS — H91.93 BILATERAL DEAFNESS: ICD-10-CM

## 2022-03-02 DIAGNOSIS — M54.31 RIGHT SCIATIC NERVE PAIN: ICD-10-CM

## 2022-03-02 DIAGNOSIS — I10 PRIMARY HYPERTENSION: Chronic | ICD-10-CM

## 2022-03-02 DIAGNOSIS — E78.2 MIXED HYPERLIPIDEMIA: ICD-10-CM

## 2022-03-02 DIAGNOSIS — I10 ESSENTIAL HYPERTENSION: Primary | ICD-10-CM

## 2022-03-02 DIAGNOSIS — Z85.46 HISTORY OF PROSTATE CANCER: ICD-10-CM

## 2022-03-02 PROCEDURE — 1160F PR REVIEW ALL MEDS BY PRESCRIBER/CLIN PHARMACIST DOCUMENTED: ICD-10-PCS | Mod: S$GLB,,, | Performed by: FAMILY MEDICINE

## 2022-03-02 PROCEDURE — 1159F MED LIST DOCD IN RCRD: CPT | Mod: S$GLB,,, | Performed by: FAMILY MEDICINE

## 2022-03-02 PROCEDURE — 1101F PR PT FALLS ASSESS DOC 0-1 FALLS W/OUT INJ PAST YR: ICD-10-PCS | Mod: S$GLB,,, | Performed by: FAMILY MEDICINE

## 2022-03-02 PROCEDURE — 3288F FALL RISK ASSESSMENT DOCD: CPT | Mod: S$GLB,,, | Performed by: FAMILY MEDICINE

## 2022-03-02 PROCEDURE — 4010F ACE/ARB THERAPY RXD/TAKEN: CPT | Mod: S$GLB,,, | Performed by: FAMILY MEDICINE

## 2022-03-02 PROCEDURE — 1160F RVW MEDS BY RX/DR IN RCRD: CPT | Mod: S$GLB,,, | Performed by: FAMILY MEDICINE

## 2022-03-02 PROCEDURE — 99214 OFFICE O/P EST MOD 30 MIN: CPT | Mod: S$GLB,,, | Performed by: FAMILY MEDICINE

## 2022-03-02 PROCEDURE — 3008F BODY MASS INDEX DOCD: CPT | Mod: S$GLB,,, | Performed by: FAMILY MEDICINE

## 2022-03-02 PROCEDURE — 3075F SYST BP GE 130 - 139MM HG: CPT | Mod: S$GLB,,, | Performed by: FAMILY MEDICINE

## 2022-03-02 PROCEDURE — 1101F PT FALLS ASSESS-DOCD LE1/YR: CPT | Mod: S$GLB,,, | Performed by: FAMILY MEDICINE

## 2022-03-02 PROCEDURE — 1159F PR MEDICATION LIST DOCUMENTED IN MEDICAL RECORD: ICD-10-PCS | Mod: S$GLB,,, | Performed by: FAMILY MEDICINE

## 2022-03-02 PROCEDURE — 3075F PR MOST RECENT SYSTOLIC BLOOD PRESS GE 130-139MM HG: ICD-10-PCS | Mod: S$GLB,,, | Performed by: FAMILY MEDICINE

## 2022-03-02 PROCEDURE — 3078F DIAST BP <80 MM HG: CPT | Mod: S$GLB,,, | Performed by: FAMILY MEDICINE

## 2022-03-02 PROCEDURE — 3008F PR BODY MASS INDEX (BMI) DOCUMENTED: ICD-10-PCS | Mod: S$GLB,,, | Performed by: FAMILY MEDICINE

## 2022-03-02 PROCEDURE — 4010F PR ACE/ARB THEARPY RXD/TAKEN: ICD-10-PCS | Mod: S$GLB,,, | Performed by: FAMILY MEDICINE

## 2022-03-02 PROCEDURE — 99214 PR OFFICE/OUTPT VISIT, EST, LEVL IV, 30-39 MIN: ICD-10-PCS | Mod: S$GLB,,, | Performed by: FAMILY MEDICINE

## 2022-03-02 PROCEDURE — 3078F PR MOST RECENT DIASTOLIC BLOOD PRESSURE < 80 MM HG: ICD-10-PCS | Mod: S$GLB,,, | Performed by: FAMILY MEDICINE

## 2022-03-02 PROCEDURE — 3288F PR FALLS RISK ASSESSMENT DOCUMENTED: ICD-10-PCS | Mod: S$GLB,,, | Performed by: FAMILY MEDICINE

## 2022-03-02 RX ORDER — ATORVASTATIN CALCIUM 40 MG/1
40 TABLET, FILM COATED ORAL DAILY
Qty: 90 TABLET | Refills: 1 | Status: SHIPPED | OUTPATIENT
Start: 2022-03-02 | End: 2022-09-06 | Stop reason: SDUPTHER

## 2022-03-02 RX ORDER — LISINOPRIL 20 MG/1
20 TABLET ORAL DAILY
Qty: 90 TABLET | Refills: 1 | Status: SHIPPED | OUTPATIENT
Start: 2022-03-02 | End: 2022-09-06 | Stop reason: SDUPTHER

## 2022-03-02 NOTE — PROGRESS NOTES
SUBJECTIVE:    Patient ID: Robert Jessica is a 76 y.o. male.    Chief Complaint: Follow-up (Brought med list // need refills // 6 F/u months // discomfort right leg // abc)    76-year-old deaf male.  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  He enjoys his detention.  He is active and can walk unlimited.  He mows the lawn and does not take any pain medication.  He does have some low back pain and stiffness.  Worse with prolonged standing.  History of lumbar disc surgery.    2016-colonoscopy with Dr. Ordonez[facility] 10 years.    2015-robotic prostatectomy-he has a August visit with his urologist.    Anemia iron deficiency.  Last ferritin was 159 and normal.  His iron pills have been discontinued.    Has a small umbilical hernia that is  Reducible.      Telephone on 12/07/2021   Component Date Value Ref Range Status    WBC 12/29/2021 5.5  3.8 - 10.8 Thousand/uL Final    RBC 12/29/2021 4.04 (A) 4.20 - 5.80 Million/uL Final    Hemoglobin 12/29/2021 12.4 (A) 13.2 - 17.1 g/dL Final    Hematocrit 12/29/2021 37.4 (A) 38.5 - 50.0 % Final    MCV 12/29/2021 92.6  80.0 - 100.0 fL Final    MCH 12/29/2021 30.7  27.0 - 33.0 pg Final    MCHC 12/29/2021 33.2  32.0 - 36.0 g/dL Final    RDW 12/29/2021 11.8  11.0 - 15.0 % Final    Platelets 12/29/2021 212  140 - 400 Thousand/uL Final    MPV 12/29/2021 9.5  7.5 - 12.5 fL Final    Neutrophils, Abs 12/29/2021 2,294  1,500 - 7,800 cells/uL Final    Lymph # 12/29/2021 2,079  850 - 3,900 cells/uL Final    Mono # 12/29/2021 649  200 - 950 cells/uL Final    Eos # 12/29/2021 440  15 - 500 cells/uL Final    Baso # 12/29/2021 39  0 - 200 cells/uL Final    Neutrophils Relative 12/29/2021 41.7  % Final    Lymph % 12/29/2021 37.8  % Final    Mono % 12/29/2021 11.8  % Final    Eosinophil % 12/29/2021 8.0  % Final    Basophil % 12/29/2021 0.7  % Final    Ferritin 12/29/2021 159  24 - 380 ng/mL Final       Past Medical History:   Diagnosis Date    Abdominal  weakness 6/27/2018    Cancer     Prostate    Heartburn     Hypertension     Prostatic cancer 8/26/2016     Social History     Socioeconomic History    Marital status:    Tobacco Use    Smoking status: Never Smoker    Smokeless tobacco: Never Used   Substance and Sexual Activity    Alcohol use: No    Drug use: No    Sexual activity: Yes     Partners: Female     Past Surgical History:   Procedure Laterality Date    PROSTATE BIOPSY  2015     Family History   Problem Relation Age of Onset    Lung cancer Father     Multiple sclerosis Sister        Review of patient's allergies indicates:   Allergen Reactions    Codeine     Vardenafil        Current Outpatient Medications:     cholecalciferol, vitamin D3, (VITAMIN D3) 25 mcg (1,000 unit) capsule, Take 1,000 Units by mouth once daily., Disp: , Rfl:     fish oil-omega-3 fatty acids 300-1,000 mg capsule, Take 2 g by mouth once daily., Disp: , Rfl:     magnesium 250 mg Tab, Take by mouth once., Disp: , Rfl:     multivit-min/ferrous fumarate (MULTI VITAMIN ORAL), Take by mouth., Disp: , Rfl:     vit A/C/E ac/ZnOx/cupric oxide (EYE VITAMIN AND MINERALS ORAL), Take by mouth., Disp: , Rfl:     atorvastatin (LIPITOR) 40 MG tablet, Take 1 tablet (40 mg total) by mouth once daily., Disp: 90 tablet, Rfl: 1    biotin 10,000 mcg Cap, Take by mouth., Disp: , Rfl:     iron,carbonyl-vitamin C (VITRON-C) 65 mg iron- 125 mg TbEC, Take by mouth., Disp: , Rfl:     lisinopriL (PRINIVIL,ZESTRIL) 20 MG tablet, Take 1 tablet (20 mg total) by mouth once daily., Disp: 90 tablet, Rfl: 1    Review of Systems   Constitutional: Negative for appetite change, chills, fatigue, fever and unexpected weight change.   HENT: Positive for hearing loss (Deaf and communicates through a  in the office). Negative for congestion, ear pain and trouble swallowing.    Eyes: Negative for pain, discharge and visual disturbance.   Respiratory: Negative for apnea,  "cough, shortness of breath and wheezing.    Cardiovascular: Negative for chest pain and leg swelling.   Gastrointestinal: Negative for abdominal pain, blood in stool, constipation, diarrhea, nausea and vomiting.        Gerd  W/ spicy foods ,    Endocrine: Negative for cold intolerance, heat intolerance and polydipsia.   Genitourinary: Negative for dysuria, hematuria, testicular pain and urgency.   Musculoskeletal: Positive for back pain (occas  sciatica , toes feel cold ). Negative for gait problem, joint swelling and myalgias.   Neurological: Negative for dizziness, seizures and numbness.   Psychiatric/Behavioral: Negative for agitation, behavioral problems and hallucinations. The patient is not nervous/anxious.           Objective:      Vitals:    03/02/22 1436   BP: 130/74   Pulse: 72   Weight: 75.8 kg (167 lb)   Height: 5' 10" (1.778 m)     Physical Exam  Vitals and nursing note reviewed.   Constitutional:       General: He is not in acute distress.     Appearance: Normal appearance. He is well-developed. He is not toxic-appearing.   HENT:      Head: Normocephalic and atraumatic.      Right Ear: Tympanic membrane and external ear normal.      Left Ear: Tympanic membrane and external ear normal.      Ears:      Comments: Deaf, communicates  W/ sign language     Nose: Nose normal.   Eyes:      Pupils: Pupils are equal, round, and reactive to light.   Neck:      Thyroid: No thyromegaly.      Vascular: No carotid bruit.   Cardiovascular:      Rate and Rhythm: Normal rate and regular rhythm.      Heart sounds: Normal heart sounds. No murmur heard.  Pulmonary:      Effort: Pulmonary effort is normal.      Breath sounds: Normal breath sounds. No wheezing or rales.   Abdominal:      General: Bowel sounds are normal. There is no distension.      Palpations: Abdomen is soft.      Tenderness: There is no abdominal tenderness.   Musculoskeletal:         General: No tenderness or deformity. Normal range of motion.      " Cervical back: Normal range of motion and neck supple.      Lumbar back: Normal. No spasms.      Comments: Bends 90 degrees at  Waist ,shoulders  And knees good  rom, no pitting edema to lower extremities.   Lymphadenopathy:      Cervical: No cervical adenopathy.   Skin:     General: Skin is warm and dry.      Findings: No rash.   Neurological:      Mental Status: He is alert and oriented to person, place, and time. Mental status is at baseline.      Cranial Nerves: No cranial nerve deficit.      Coordination: Coordination normal.   Psychiatric:         Mood and Affect: Mood normal.         Behavior: Behavior normal.         Thought Content: Thought content normal.         Judgment: Judgment normal.           Assessment:       1. Essential hypertension    2. Mixed hyperlipidemia    3. Bilateral deafness    4. Primary hypertension    5. History of prostate cancer    6. Umbilical hernia without obstruction and without gangrene    7. Right sciatic nerve pain         Plan:       Essential hypertension  Comments:  Currently stable and well controlled.  Continue lisinopril 20mg.  Refill sent today.  Orders:  -     lisinopriL (PRINIVIL,ZESTRIL) 20 MG tablet; Take 1 tablet (20 mg total) by mouth once daily.  Dispense: 90 tablet; Refill: 1    Mixed hyperlipidemia  .  Orders:  -     atorvastatin (LIPITOR) 40 MG tablet; Take 1 tablet (40 mg total) by mouth once daily.  Dispense: 90 tablet; Refill: 1   He will come back in the morning and do fasting lab work.  Bilateral deafness  Communicating through   Primary hypertension    History of prostate cancer  Follows up with his urologist in [place] in August  Umbilical hernia without obstruction and without gangrene  Pain-free and asymptomatic, no need for surgery at this time.  Right sciatic nerve pain  Managing his discomfort very conservatively.    Follow up in about 6 months (around 9/2/2022).        3/2/2022 Henry Villaseñor

## 2022-03-05 LAB
ALBUMIN SERPL-MCNC: 4.4 G/DL (ref 3.6–5.1)
ALBUMIN/CREAT UR: 4 MCG/MG CREAT
ALBUMIN/GLOB SERPL: 2.2 (CALC) (ref 1–2.5)
ALP SERPL-CCNC: 51 U/L (ref 35–144)
ALT SERPL-CCNC: 28 U/L (ref 9–46)
APPEARANCE UR: CLEAR
AST SERPL-CCNC: 20 U/L (ref 10–35)
BACTERIA #/AREA URNS HPF: ABNORMAL /HPF
BACTERIA UR CULT: ABNORMAL
BASOPHILS # BLD AUTO: 39 CELLS/UL (ref 0–200)
BASOPHILS NFR BLD AUTO: 0.8 %
BILIRUB SERPL-MCNC: 0.4 MG/DL (ref 0.2–1.2)
BILIRUB UR QL STRIP: NEGATIVE
BUN SERPL-MCNC: 17 MG/DL (ref 7–25)
BUN/CREAT SERPL: ABNORMAL (CALC) (ref 6–22)
CALCIUM SERPL-MCNC: 9.5 MG/DL (ref 8.6–10.3)
CHLORIDE SERPL-SCNC: 103 MMOL/L (ref 98–110)
CHOLEST SERPL-MCNC: 143 MG/DL
CHOLEST/HDLC SERPL: 2.9 (CALC)
CO2 SERPL-SCNC: 32 MMOL/L (ref 20–32)
COLOR UR: YELLOW
CREAT SERPL-MCNC: 1 MG/DL (ref 0.7–1.18)
CREAT UR-MCNC: 174 MG/DL (ref 20–320)
EOSINOPHIL # BLD AUTO: 353 CELLS/UL (ref 15–500)
EOSINOPHIL NFR BLD AUTO: 7.2 %
ERYTHROCYTE [DISTWIDTH] IN BLOOD BY AUTOMATED COUNT: 11.8 % (ref 11–15)
GLOBULIN SER CALC-MCNC: 2 G/DL (CALC) (ref 1.9–3.7)
GLUCOSE SERPL-MCNC: 105 MG/DL (ref 65–99)
GLUCOSE UR QL STRIP: NEGATIVE
HCT VFR BLD AUTO: 37.6 % (ref 38.5–50)
HDLC SERPL-MCNC: 49 MG/DL
HGB BLD-MCNC: 12.8 G/DL (ref 13.2–17.1)
HGB UR QL STRIP: NEGATIVE
HYALINE CASTS #/AREA URNS LPF: ABNORMAL /LPF
KETONES UR QL STRIP: ABNORMAL
LDLC SERPL CALC-MCNC: 78 MG/DL (CALC)
LEUKOCYTE ESTERASE UR QL STRIP: NEGATIVE
LYMPHOCYTES # BLD AUTO: 1730 CELLS/UL (ref 850–3900)
LYMPHOCYTES NFR BLD AUTO: 35.3 %
MCH RBC QN AUTO: 31.1 PG (ref 27–33)
MCHC RBC AUTO-ENTMCNC: 34 G/DL (ref 32–36)
MCV RBC AUTO: 91.5 FL (ref 80–100)
MICROALBUMIN UR-MCNC: 0.7 MG/DL
MONOCYTES # BLD AUTO: 515 CELLS/UL (ref 200–950)
MONOCYTES NFR BLD AUTO: 10.5 %
NEUTROPHILS # BLD AUTO: 2264 CELLS/UL (ref 1500–7800)
NEUTROPHILS NFR BLD AUTO: 46.2 %
NITRITE UR QL STRIP: NEGATIVE
NONHDLC SERPL-MCNC: 94 MG/DL (CALC)
PH UR STRIP: 6 [PH] (ref 5–8)
PLATELET # BLD AUTO: 235 THOUSAND/UL (ref 140–400)
PMV BLD REES-ECKER: 9.3 FL (ref 7.5–12.5)
POTASSIUM SERPL-SCNC: 4.2 MMOL/L (ref 3.5–5.3)
PROT SERPL-MCNC: 6.4 G/DL (ref 6.1–8.1)
PROT UR QL STRIP: NEGATIVE
RBC # BLD AUTO: 4.11 MILLION/UL (ref 4.2–5.8)
RBC #/AREA URNS HPF: ABNORMAL /HPF
SODIUM SERPL-SCNC: 141 MMOL/L (ref 135–146)
SP GR UR STRIP: 1.02 (ref 1–1.03)
SQUAMOUS #/AREA URNS HPF: ABNORMAL /HPF
TRIGL SERPL-MCNC: 75 MG/DL
TSH SERPL-ACNC: 1.47 MIU/L (ref 0.4–4.5)
WBC # BLD AUTO: 4.9 THOUSAND/UL (ref 3.8–10.8)
WBC #/AREA URNS HPF: ABNORMAL /HPF

## 2022-03-07 ENCOUNTER — TELEPHONE (OUTPATIENT)
Dept: FAMILY MEDICINE | Facility: CLINIC | Age: 77
End: 2022-03-07
Payer: MEDICARE

## 2022-03-07 NOTE — PROGRESS NOTES
Notify patient.  He is deaf.  Send message that all his lab work looks stable and normal.  Continue current medications.

## 2022-03-07 NOTE — TELEPHONE ENCOUNTER
----- Message from Henry Villaseñor MD sent at 3/6/2022  8:45 PM CST -----  Notify patient.  He is deaf.  Send message that all his lab work looks stable and normal.  Continue current medications.

## 2022-09-01 ENCOUNTER — TELEPHONE (OUTPATIENT)
Dept: FAMILY MEDICINE | Facility: CLINIC | Age: 77
End: 2022-09-01

## 2022-09-06 ENCOUNTER — OFFICE VISIT (OUTPATIENT)
Dept: FAMILY MEDICINE | Facility: CLINIC | Age: 77
End: 2022-09-06
Payer: MEDICARE

## 2022-09-06 ENCOUNTER — TELEPHONE (OUTPATIENT)
Dept: FAMILY MEDICINE | Facility: CLINIC | Age: 77
End: 2022-09-06

## 2022-09-06 VITALS
SYSTOLIC BLOOD PRESSURE: 138 MMHG | WEIGHT: 164 LBS | BODY MASS INDEX: 23.48 KG/M2 | DIASTOLIC BLOOD PRESSURE: 68 MMHG | HEART RATE: 110 BPM | HEIGHT: 70 IN

## 2022-09-06 DIAGNOSIS — I10 PRIMARY HYPERTENSION: Chronic | ICD-10-CM

## 2022-09-06 DIAGNOSIS — I10 ESSENTIAL HYPERTENSION: ICD-10-CM

## 2022-09-06 DIAGNOSIS — E78.2 MIXED HYPERLIPIDEMIA: ICD-10-CM

## 2022-09-06 DIAGNOSIS — Z85.46 HISTORY OF PROSTATE CANCER: ICD-10-CM

## 2022-09-06 DIAGNOSIS — K04.7 DENTAL ABSCESS: Primary | ICD-10-CM

## 2022-09-06 DIAGNOSIS — H91.93 BILATERAL DEAFNESS: ICD-10-CM

## 2022-09-06 PROCEDURE — 3066F PR DOCUMENTATION OF TREATMENT FOR NEPHROPATHY: ICD-10-PCS | Mod: CPTII,S$GLB,, | Performed by: FAMILY MEDICINE

## 2022-09-06 PROCEDURE — 4010F PR ACE/ARB THEARPY RXD/TAKEN: ICD-10-PCS | Mod: CPTII,S$GLB,, | Performed by: FAMILY MEDICINE

## 2022-09-06 PROCEDURE — 1125F PR PAIN SEVERITY QUANTIFIED, PAIN PRESENT: ICD-10-PCS | Mod: CPTII,S$GLB,, | Performed by: FAMILY MEDICINE

## 2022-09-06 PROCEDURE — 3075F SYST BP GE 130 - 139MM HG: CPT | Mod: CPTII,S$GLB,, | Performed by: FAMILY MEDICINE

## 2022-09-06 PROCEDURE — 3078F DIAST BP <80 MM HG: CPT | Mod: CPTII,S$GLB,, | Performed by: FAMILY MEDICINE

## 2022-09-06 PROCEDURE — 1159F PR MEDICATION LIST DOCUMENTED IN MEDICAL RECORD: ICD-10-PCS | Mod: CPTII,S$GLB,, | Performed by: FAMILY MEDICINE

## 2022-09-06 PROCEDURE — 4010F ACE/ARB THERAPY RXD/TAKEN: CPT | Mod: CPTII,S$GLB,, | Performed by: FAMILY MEDICINE

## 2022-09-06 PROCEDURE — 3078F PR MOST RECENT DIASTOLIC BLOOD PRESSURE < 80 MM HG: ICD-10-PCS | Mod: CPTII,S$GLB,, | Performed by: FAMILY MEDICINE

## 2022-09-06 PROCEDURE — 1159F MED LIST DOCD IN RCRD: CPT | Mod: CPTII,S$GLB,, | Performed by: FAMILY MEDICINE

## 2022-09-06 PROCEDURE — 3061F PR NEG MICROALBUMINURIA RESULT DOCUMENTED/REVIEW: ICD-10-PCS | Mod: CPTII,S$GLB,, | Performed by: FAMILY MEDICINE

## 2022-09-06 PROCEDURE — 3066F NEPHROPATHY DOC TX: CPT | Mod: CPTII,S$GLB,, | Performed by: FAMILY MEDICINE

## 2022-09-06 PROCEDURE — 1125F AMNT PAIN NOTED PAIN PRSNT: CPT | Mod: CPTII,S$GLB,, | Performed by: FAMILY MEDICINE

## 2022-09-06 PROCEDURE — 99214 PR OFFICE/OUTPT VISIT, EST, LEVL IV, 30-39 MIN: ICD-10-PCS | Mod: S$GLB,,, | Performed by: FAMILY MEDICINE

## 2022-09-06 PROCEDURE — 99214 OFFICE O/P EST MOD 30 MIN: CPT | Mod: S$GLB,,, | Performed by: FAMILY MEDICINE

## 2022-09-06 PROCEDURE — 3061F NEG MICROALBUMINURIA REV: CPT | Mod: CPTII,S$GLB,, | Performed by: FAMILY MEDICINE

## 2022-09-06 PROCEDURE — 3075F PR MOST RECENT SYSTOLIC BLOOD PRESS GE 130-139MM HG: ICD-10-PCS | Mod: CPTII,S$GLB,, | Performed by: FAMILY MEDICINE

## 2022-09-06 RX ORDER — AMOXICILLIN 500 MG/1
500 CAPSULE ORAL 3 TIMES DAILY
Qty: 30 CAPSULE | Refills: 0 | Status: SHIPPED | OUTPATIENT
Start: 2022-09-06 | End: 2022-09-06

## 2022-09-06 RX ORDER — LISINOPRIL 20 MG/1
20 TABLET ORAL DAILY
Qty: 90 TABLET | Refills: 1 | Status: SHIPPED | OUTPATIENT
Start: 2022-09-06 | End: 2023-03-06 | Stop reason: SDUPTHER

## 2022-09-06 RX ORDER — ATORVASTATIN CALCIUM 40 MG/1
40 TABLET, FILM COATED ORAL DAILY
Qty: 90 TABLET | Refills: 1 | Status: SHIPPED | OUTPATIENT
Start: 2022-09-06 | End: 2023-03-06 | Stop reason: SDUPTHER

## 2022-09-06 RX ORDER — DOXYCYCLINE 100 MG/1
100 CAPSULE ORAL 2 TIMES DAILY
Qty: 20 CAPSULE | Refills: 0 | Status: SHIPPED | OUTPATIENT
Start: 2022-09-06

## 2022-09-06 RX ORDER — TRAMADOL HYDROCHLORIDE 50 MG/1
50 TABLET ORAL 2 TIMES DAILY PRN
Qty: 20 TABLET | Refills: 0 | Status: SHIPPED | OUTPATIENT
Start: 2022-09-06

## 2022-09-06 NOTE — PROGRESS NOTES
SUBJECTIVE:    Patient ID: Robert Jessica is a 77 y.o. male.    Chief Complaint: Hypertension (6mth, brought bottles, left side jaw swollen and painfull// SW)    This 77-year-old male who has deafness.  He comes in complaining of a 3 day history of fever chills and swelling in his left jaw.  The bad tooth on his lower incisors is causing pain.  He denies sore throat her nausea vomiting has a mild cough.  He has a dental appointment later this afternoon.    He has had 2 COVID vaccines but no booster yet.    Hypertension stable on medication    Hyperlipidemia, on atorvastatin 20 mg daily.      No visits with results within 6 Month(s) from this visit.   Latest known visit with results is:   Telephone on 02/17/2022   Component Date Value Ref Range Status    WBC 03/04/2022 4.9  3.8 - 10.8 Thousand/uL Final    RBC 03/04/2022 4.11 (L)  4.20 - 5.80 Million/uL Final    Hemoglobin 03/04/2022 12.8 (L)  13.2 - 17.1 g/dL Final    Hematocrit 03/04/2022 37.6 (L)  38.5 - 50.0 % Final    MCV 03/04/2022 91.5  80.0 - 100.0 fL Final    MCH 03/04/2022 31.1  27.0 - 33.0 pg Final    MCHC 03/04/2022 34.0  32.0 - 36.0 g/dL Final    RDW 03/04/2022 11.8  11.0 - 15.0 % Final    Platelets 03/04/2022 235  140 - 400 Thousand/uL Final    MPV 03/04/2022 9.3  7.5 - 12.5 fL Final    Neutrophils, Abs 03/04/2022 2,264  1,500 - 7,800 cells/uL Final    Lymph # 03/04/2022 1,730  850 - 3,900 cells/uL Final    Mono # 03/04/2022 515  200 - 950 cells/uL Final    Eos # 03/04/2022 353  15 - 500 cells/uL Final    Baso # 03/04/2022 39  0 - 200 cells/uL Final    Neutrophils Relative 03/04/2022 46.2  % Final    Lymph % 03/04/2022 35.3  % Final    Mono % 03/04/2022 10.5  % Final    Eosinophil % 03/04/2022 7.2  % Final    Basophil % 03/04/2022 0.8  % Final    Glucose 03/04/2022 105 (H)  65 - 99 mg/dL Final    BUN 03/04/2022 17  7 - 25 mg/dL Final    Creatinine 03/04/2022 1.00  0.70 - 1.18 mg/dL Final    eGFR if non African American 03/04/2022 74  > OR = 60  mL/min/1.73m2 Final    eGFR if African American 03/04/2022 86  > OR = 60 mL/min/1.73m2 Final    BUN/Creatinine Ratio 03/04/2022 NOT APPLICABLE  6 - 22 (calc) Final    Sodium 03/04/2022 141  135 - 146 mmol/L Final    Potassium 03/04/2022 4.2  3.5 - 5.3 mmol/L Final    Chloride 03/04/2022 103  98 - 110 mmol/L Final    CO2 03/04/2022 32  20 - 32 mmol/L Final    Calcium 03/04/2022 9.5  8.6 - 10.3 mg/dL Final    Total Protein 03/04/2022 6.4  6.1 - 8.1 g/dL Final    Albumin 03/04/2022 4.4  3.6 - 5.1 g/dL Final    Globulin, Total 03/04/2022 2.0  1.9 - 3.7 g/dL (calc) Final    Albumin/Globulin Ratio 03/04/2022 2.2  1.0 - 2.5 (calc) Final    Total Bilirubin 03/04/2022 0.4  0.2 - 1.2 mg/dL Final    Alkaline Phosphatase 03/04/2022 51  35 - 144 U/L Final    AST 03/04/2022 20  10 - 35 U/L Final    ALT 03/04/2022 28  9 - 46 U/L Final    Cholesterol 03/04/2022 143  <200 mg/dL Final    HDL 03/04/2022 49  > OR = 40 mg/dL Final    Triglycerides 03/04/2022 75  <150 mg/dL Final    LDL Cholesterol 03/04/2022 78  mg/dL (calc) Final    HDL/Cholesterol Ratio 03/04/2022 2.9  <5.0 (calc) Final    Non HDL Chol. (LDL+VLDL) 03/04/2022 94  <130 mg/dL (calc) Final    Creatinine, Urine 03/04/2022 174  20 - 320 mg/dL Final    Microalb, Ur 03/04/2022 0.7  See Note: mg/dL Final    Microalb/Creat Ratio 03/04/2022 4  <30 mcg/mg creat Final    TSH 03/04/2022 1.47  0.40 - 4.50 mIU/L Final    Color, UA 03/04/2022 YELLOW  YELLOW Final    Appearance, UA 03/04/2022 CLEAR  CLEAR Final    Specific Gravity, UA 03/04/2022 1.023  1.001 - 1.035 Final    pH, UA 03/04/2022 6.0  5.0 - 8.0 Final    Glucose, UA 03/04/2022 NEGATIVE  NEGATIVE Final    Bilirubin, UA 03/04/2022 NEGATIVE  NEGATIVE Final    Ketones, UA 03/04/2022 TRACE (A)  NEGATIVE Final    Occult Blood UA 03/04/2022 NEGATIVE  NEGATIVE Final    Protein, UA 03/04/2022 NEGATIVE  NEGATIVE Final    Nitrite, UA 03/04/2022 NEGATIVE  NEGATIVE Final    Leukocytes, UA 03/04/2022 NEGATIVE  NEGATIVE Final    WBC  Casts, UA 03/04/2022 NONE SEEN  < OR = 5 /HPF Final    RBC Casts, UA 03/04/2022 0-2  < OR = 2 /HPF Final    Squam Epithel, UA 03/04/2022 NONE SEEN  < OR = 5 /HPF Final    Bacteria, UA 03/04/2022 NONE SEEN  NONE SEEN /HPF Final    Hyaline Casts, UA 03/04/2022 NONE SEEN  NONE SEEN /LPF Final    Reflexive Urine Culture 03/04/2022    Final       Past Medical History:   Diagnosis Date    Abdominal weakness 6/27/2018    Cancer     Prostate    Heartburn     Hypertension     Prostatic cancer 8/26/2016     Social History     Socioeconomic History    Marital status:    Tobacco Use    Smoking status: Never    Smokeless tobacco: Never   Substance and Sexual Activity    Alcohol use: No    Drug use: No    Sexual activity: Yes     Partners: Female     Past Surgical History:   Procedure Laterality Date    PROSTATE BIOPSY  2015     Family History   Problem Relation Age of Onset    Lung cancer Father     Multiple sclerosis Sister        Review of patient's allergies indicates:   Allergen Reactions    Codeine     Vardenafil        Current Outpatient Medications:     biotin 10,000 mcg Cap, Take by mouth., Disp: , Rfl:     cholecalciferol, vitamin D3, (VITAMIN D3) 25 mcg (1,000 unit) capsule, Take 1,000 Units by mouth once daily., Disp: , Rfl:     fish oil-omega-3 fatty acids 300-1,000 mg capsule, Take 2 g by mouth once daily., Disp: , Rfl:     iron,carbonyl-vitamin C (VITRON-C) 65 mg iron- 125 mg TbEC, Take by mouth., Disp: , Rfl:     magnesium 250 mg Tab, Take by mouth once., Disp: , Rfl:     multivit-min/ferrous fumarate (MULTI VITAMIN ORAL), Take by mouth., Disp: , Rfl:     vit A/C/E ac/ZnOx/cupric oxide (EYE VITAMIN AND MINERALS ORAL), Take by mouth., Disp: , Rfl:     amoxicillin (AMOXIL) 500 MG capsule, Take 1 capsule (500 mg total) by mouth 3 (three) times daily., Disp: 30 capsule, Rfl: 0    atorvastatin (LIPITOR) 40 MG tablet, Take 1 tablet (40 mg total) by mouth once daily., Disp: 90 tablet, Rfl: 1    lisinopriL  "(PRINIVIL,ZESTRIL) 20 MG tablet, Take 1 tablet (20 mg total) by mouth once daily., Disp: 90 tablet, Rfl: 1    traMADoL (ULTRAM) 50 mg tablet, Take 1 tablet (50 mg total) by mouth 2 (two) times daily as needed for Pain., Disp: 20 tablet, Rfl: 0    Review of Systems   Constitutional:  Negative for appetite change, chills, fatigue, fever and unexpected weight change.   HENT:  Positive for dental problem (Has a large swollen left jaw, lower jaw). Negative for congestion, ear pain and trouble swallowing. Hearing loss: deaf communicates with sign language.   Eyes:  Negative for pain, discharge and visual disturbance.   Respiratory:  Negative for apnea, cough, shortness of breath and wheezing.    Cardiovascular:  Negative for chest pain and leg swelling.   Gastrointestinal:  Negative for abdominal pain, blood in stool, constipation, diarrhea, nausea and vomiting.   Endocrine: Negative for cold intolerance, heat intolerance and polydipsia.   Genitourinary:  Negative for dysuria, hematuria, testicular pain and urgency.   Musculoskeletal:  Negative for gait problem, joint swelling and myalgias.   Neurological:  Negative for dizziness, seizures and numbness.   Psychiatric/Behavioral:  Negative for agitation, behavioral problems and hallucinations. The patient is not nervous/anxious.         Objective:      Vitals:    09/06/22 1009   BP: 138/68   Pulse: 110   Weight: 74.4 kg (164 lb)   Height: 5' 10" (1.778 m)     Physical Exam  Vitals and nursing note reviewed. Exam conducted with a chaperone present.   Constitutional:       Appearance: Normal appearance. He is well-developed and normal weight.      Comments: Communicates through  electronic Amelia   HENT:      Head: Normocephalic and atraumatic.      Right Ear: Tympanic membrane and external ear normal.      Left Ear: Tympanic membrane and external ear normal.      Nose: Nose normal.      Mouth/Throat:      Comments: Lower jaw has tenderness and swelling, abscessed " tooth in the left lower incisor area  Eyes:      Pupils: Pupils are equal, round, and reactive to light.   Neck:      Thyroid: No thyromegaly.      Vascular: No carotid bruit.   Cardiovascular:      Rate and Rhythm: Normal rate and regular rhythm.      Heart sounds: Normal heart sounds. No murmur heard.  Pulmonary:      Effort: Pulmonary effort is normal.      Breath sounds: Normal breath sounds. No wheezing or rales.   Abdominal:      General: Bowel sounds are normal. There is no distension.      Palpations: Abdomen is soft.      Tenderness: There is no abdominal tenderness.   Musculoskeletal:         General: No tenderness or deformity. Normal range of motion.      Cervical back: Normal range of motion and neck supple.      Lumbar back: Normal. No spasms.      Comments: Bends 90 degrees at  waist, good range of motion knees and shoulders, no pitting edema to lower extremities   Lymphadenopathy:      Cervical: No cervical adenopathy.   Skin:     General: Skin is warm and dry.      Findings: No rash.   Neurological:      Mental Status: He is alert and oriented to person, place, and time. Mental status is at baseline.      Cranial Nerves: No cranial nerve deficit.      Motor: No weakness.      Coordination: Coordination normal.      Gait: Gait normal.   Psychiatric:         Behavior: Behavior normal.         Thought Content: Thought content normal.         Judgment: Judgment normal.         Assessment:       1. Dental abscess    2. Mixed hyperlipidemia    3. Essential hypertension    4. Primary hypertension    5. Bilateral deafness    6. History of prostate cancer           Plan:       Dental abscess  -     amoxicillin (AMOXIL) 500 MG capsule; Take 1 capsule (500 mg total) by mouth 3 (three) times daily.  Dispense: 30 capsule; Refill: 0  -     traMADoL (ULTRAM) 50 mg tablet; Take 1 tablet (50 mg total) by mouth 2 (two) times daily as needed for Pain.  Dispense: 20 tablet; Refill: 0  Will treat dental abscess with  amoxicillin and tramadol for pain, stable go to [facility] for appointment today.  He may need an extraction eventually.  Mixed hyperlipidemia  Comments:  Last lab work was in February 2021. Lipid panel was stable. Will repeat lab work today prior to leaving office. Continue Lipitor 40mg. Refill sent today.  Orders:  -     atorvastatin (LIPITOR) 40 MG tablet; Take 1 tablet (40 mg total) by mouth once daily.  Dispense: 90 tablet; Refill: 1    Essential hypertension  Comments:  Currently stable and well controlled.  Continue lisinopril 20mg.  Refill sent today.  Orders:  -     lisinopriL (PRINIVIL,ZESTRIL) 20 MG tablet; Take 1 tablet (20 mg total) by mouth once daily.  Dispense: 90 tablet; Refill: 1    Primary hypertension  Blood pressure well controlled  Bilateral deafness    History of prostate cancer    No follow-ups on file.        9/6/2022 Henry Villaseñor

## 2022-09-06 NOTE — TELEPHONE ENCOUNTER
----- Message from Parul lAas sent at 9/6/2022 11:13 AM CDT -----  Bianca with [facility] pharmacy called and stated that they received a prescription for amoxicillin but on his profile they have where is allergic to penicillin please give her a call back and advise [phone]

## 2022-09-06 NOTE — TELEPHONE ENCOUNTER
----- Message from Estrella Taylor sent at 9/6/2022 10:48 AM CDT -----  Pt has an appt for 3/6/23 @ 10:40 and needs an . Thank you

## 2023-02-20 ENCOUNTER — PATIENT OUTREACH (OUTPATIENT)
Dept: ADMINISTRATIVE | Facility: HOSPITAL | Age: 78
End: 2023-02-20
Payer: MEDICARE

## 2023-02-20 NOTE — PROGRESS NOTES
Chart review completed 02/20/2023.  Care Everywhere updated and reviewed. [facility], [facility] and [facility] reviewed. No new HM items found.     No orders to review at this time.     Immunizations reviewed.

## 2023-03-02 ENCOUNTER — TELEPHONE (OUTPATIENT)
Dept: FAMILY MEDICINE | Facility: CLINIC | Age: 78
End: 2023-03-02

## 2023-03-02 NOTE — TELEPHONE ENCOUNTER
Confirmed  with Margo.   Informed pt wife  confirmed. Not sure who it will be, but an  will be here. She voiced understanding.

## 2023-03-02 NOTE — TELEPHONE ENCOUNTER
----- Message from Kallie Mcgovern MA sent at 3/2/2023  2:54 PM CST -----    ----- Message -----  From: Parul Alas  Sent: 3/2/2023   2:48 PM CST  To: Henry Villaseñor Staff    Patient wife called and would like to know  who will be the  interperter for the appointment for Monday  [phone]

## 2023-03-06 ENCOUNTER — TELEPHONE (OUTPATIENT)
Dept: FAMILY MEDICINE | Facility: CLINIC | Age: 78
End: 2023-03-06

## 2023-03-06 ENCOUNTER — OFFICE VISIT (OUTPATIENT)
Dept: FAMILY MEDICINE | Facility: CLINIC | Age: 78
End: 2023-03-06
Payer: MEDICARE

## 2023-03-06 VITALS
DIASTOLIC BLOOD PRESSURE: 72 MMHG | BODY MASS INDEX: 26.06 KG/M2 | HEART RATE: 80 BPM | WEIGHT: 166 LBS | HEIGHT: 67 IN | SYSTOLIC BLOOD PRESSURE: 128 MMHG

## 2023-03-06 DIAGNOSIS — I10 PRIMARY HYPERTENSION: Chronic | ICD-10-CM

## 2023-03-06 DIAGNOSIS — E78.2 MIXED HYPERLIPIDEMIA: ICD-10-CM

## 2023-03-06 DIAGNOSIS — I10 ESSENTIAL HYPERTENSION: Primary | ICD-10-CM

## 2023-03-06 DIAGNOSIS — K42.9 UMBILICAL HERNIA WITHOUT OBSTRUCTION AND WITHOUT GANGRENE: ICD-10-CM

## 2023-03-06 DIAGNOSIS — Z85.46 HISTORY OF PROSTATE CANCER: ICD-10-CM

## 2023-03-06 DIAGNOSIS — M54.41 ACUTE RIGHT-SIDED LOW BACK PAIN WITH RIGHT-SIDED SCIATICA: ICD-10-CM

## 2023-03-06 DIAGNOSIS — H91.93 BILATERAL DEAFNESS: ICD-10-CM

## 2023-03-06 PROCEDURE — 3078F DIAST BP <80 MM HG: CPT | Mod: CPTII,S$GLB,, | Performed by: FAMILY MEDICINE

## 2023-03-06 PROCEDURE — 1159F PR MEDICATION LIST DOCUMENTED IN MEDICAL RECORD: ICD-10-PCS | Mod: CPTII,S$GLB,, | Performed by: FAMILY MEDICINE

## 2023-03-06 PROCEDURE — 3074F SYST BP LT 130 MM HG: CPT | Mod: CPTII,S$GLB,, | Performed by: FAMILY MEDICINE

## 2023-03-06 PROCEDURE — 1159F MED LIST DOCD IN RCRD: CPT | Mod: CPTII,S$GLB,, | Performed by: FAMILY MEDICINE

## 2023-03-06 PROCEDURE — 99214 PR OFFICE/OUTPT VISIT, EST, LEVL IV, 30-39 MIN: ICD-10-PCS | Mod: S$GLB,,, | Performed by: FAMILY MEDICINE

## 2023-03-06 PROCEDURE — 3288F FALL RISK ASSESSMENT DOCD: CPT | Mod: CPTII,S$GLB,, | Performed by: FAMILY MEDICINE

## 2023-03-06 PROCEDURE — 3288F PR FALLS RISK ASSESSMENT DOCUMENTED: ICD-10-PCS | Mod: CPTII,S$GLB,, | Performed by: FAMILY MEDICINE

## 2023-03-06 PROCEDURE — 3078F PR MOST RECENT DIASTOLIC BLOOD PRESSURE < 80 MM HG: ICD-10-PCS | Mod: CPTII,S$GLB,, | Performed by: FAMILY MEDICINE

## 2023-03-06 PROCEDURE — 1101F PR PT FALLS ASSESS DOC 0-1 FALLS W/OUT INJ PAST YR: ICD-10-PCS | Mod: CPTII,S$GLB,, | Performed by: FAMILY MEDICINE

## 2023-03-06 PROCEDURE — 1101F PT FALLS ASSESS-DOCD LE1/YR: CPT | Mod: CPTII,S$GLB,, | Performed by: FAMILY MEDICINE

## 2023-03-06 PROCEDURE — 3074F PR MOST RECENT SYSTOLIC BLOOD PRESSURE < 130 MM HG: ICD-10-PCS | Mod: CPTII,S$GLB,, | Performed by: FAMILY MEDICINE

## 2023-03-06 PROCEDURE — 99214 OFFICE O/P EST MOD 30 MIN: CPT | Mod: S$GLB,,, | Performed by: FAMILY MEDICINE

## 2023-03-06 RX ORDER — ATORVASTATIN CALCIUM 40 MG/1
40 TABLET, FILM COATED ORAL DAILY
Qty: 90 TABLET | Refills: 3 | Status: SHIPPED | OUTPATIENT
Start: 2023-03-06 | End: 2023-09-07 | Stop reason: SDUPTHER

## 2023-03-06 RX ORDER — LISINOPRIL 20 MG/1
20 TABLET ORAL DAILY
Qty: 90 TABLET | Refills: 3 | Status: SHIPPED | OUTPATIENT
Start: 2023-03-06 | End: 2024-03-18 | Stop reason: SDUPTHER

## 2023-03-06 NOTE — PROGRESS NOTES
SUBJECTIVE:    Patient ID: Robert Jessica is a 77 y.o. male.    Chief Complaint: Hypertension (Brought bottles, need refills,right side low-back pain, declined vaccines,  abc )    77-year-old male here for six-month checkup.  He has been retired from the body shop work for 2-3 years.  He fell off a ladder in December of 2022 and has had some intermittent back aches since that time.  Some intermittent headaches which respond to Aleve or Advil.  He has had lumbar disc surgery x2.    Prostate cancer-sees Dr. Hernandez yearly.  He had a prostatectomy in 2015 and his PSAs have been undetectable since that time.    2016-Dr. Ordonez colonoscopy-RTC 10 years    Complains of some mild arthritis to his hands and thumbs, has difficulty opening jars.    Has numerous moles and seborrheic keratoses that he like to get checked out      No visits with results within 6 Month(s) from this visit.   Latest known visit with results is:   Telephone on 02/17/2022   Component Date Value Ref Range Status    WBC 03/04/2022 4.9  3.8 - 10.8 Thousand/uL Final    RBC 03/04/2022 4.11 (L)  4.20 - 5.80 Million/uL Final    Hemoglobin 03/04/2022 12.8 (L)  13.2 - 17.1 g/dL Final    Hematocrit 03/04/2022 37.6 (L)  38.5 - 50.0 % Final    MCV 03/04/2022 91.5  80.0 - 100.0 fL Final    MCH 03/04/2022 31.1  27.0 - 33.0 pg Final    MCHC 03/04/2022 34.0  32.0 - 36.0 g/dL Final    RDW 03/04/2022 11.8  11.0 - 15.0 % Final    Platelets 03/04/2022 235  140 - 400 Thousand/uL Final    MPV 03/04/2022 9.3  7.5 - 12.5 fL Final    Neutrophils, Abs 03/04/2022 2,264  1,500 - 7,800 cells/uL Final    Lymph # 03/04/2022 1,730  850 - 3,900 cells/uL Final    Mono # 03/04/2022 515  200 - 950 cells/uL Final    Eos # 03/04/2022 353  15 - 500 cells/uL Final    Baso # 03/04/2022 39  0 - 200 cells/uL Final    Neutrophils Relative 03/04/2022 46.2  % Final    Lymph % 03/04/2022 35.3  % Final    Mono % 03/04/2022 10.5  % Final    Eosinophil % 03/04/2022 7.2  % Final    Basophil %  03/04/2022 0.8  % Final    Glucose 03/04/2022 105 (H)  65 - 99 mg/dL Final    BUN 03/04/2022 17  7 - 25 mg/dL Final    Creatinine 03/04/2022 1.00  0.70 - 1.18 mg/dL Final    eGFR if non African American 03/04/2022 74  > OR = 60 mL/min/1.73m2 Final    eGFR if African American 03/04/2022 86  > OR = 60 mL/min/1.73m2 Final    BUN/Creatinine Ratio 03/04/2022 NOT APPLICABLE  6 - 22 (calc) Final    Sodium 03/04/2022 141  135 - 146 mmol/L Final    Potassium 03/04/2022 4.2  3.5 - 5.3 mmol/L Final    Chloride 03/04/2022 103  98 - 110 mmol/L Final    CO2 03/04/2022 32  20 - 32 mmol/L Final    Calcium 03/04/2022 9.5  8.6 - 10.3 mg/dL Final    Total Protein 03/04/2022 6.4  6.1 - 8.1 g/dL Final    Albumin 03/04/2022 4.4  3.6 - 5.1 g/dL Final    Globulin, Total 03/04/2022 2.0  1.9 - 3.7 g/dL (calc) Final    Albumin/Globulin Ratio 03/04/2022 2.2  1.0 - 2.5 (calc) Final    Total Bilirubin 03/04/2022 0.4  0.2 - 1.2 mg/dL Final    Alkaline Phosphatase 03/04/2022 51  35 - 144 U/L Final    AST 03/04/2022 20  10 - 35 U/L Final    ALT 03/04/2022 28  9 - 46 U/L Final    Cholesterol 03/04/2022 143  <200 mg/dL Final    HDL 03/04/2022 49  > OR = 40 mg/dL Final    Triglycerides 03/04/2022 75  <150 mg/dL Final    LDL Cholesterol 03/04/2022 78  mg/dL (calc) Final    HDL/Cholesterol Ratio 03/04/2022 2.9  <5.0 (calc) Final    Non HDL Chol. (LDL+VLDL) 03/04/2022 94  <130 mg/dL (calc) Final    Creatinine, Urine 03/04/2022 174  20 - 320 mg/dL Final    Microalb, Ur 03/04/2022 0.7  See Note: mg/dL Final    Microalb/Creat Ratio 03/04/2022 4  <30 mcg/mg creat Final    TSH 03/04/2022 1.47  0.40 - 4.50 mIU/L Final    Color, UA 03/04/2022 YELLOW  YELLOW Final    Appearance, UA 03/04/2022 CLEAR  CLEAR Final    Specific Gravity, UA 03/04/2022 1.023  1.001 - 1.035 Final    pH, UA 03/04/2022 6.0  5.0 - 8.0 Final    Glucose, UA 03/04/2022 NEGATIVE  NEGATIVE Final    Bilirubin, UA 03/04/2022 NEGATIVE  NEGATIVE Final    Ketones, UA 03/04/2022 TRACE (A)  NEGATIVE  Final    Occult Blood UA 03/04/2022 NEGATIVE  NEGATIVE Final    Protein, UA 03/04/2022 NEGATIVE  NEGATIVE Final    Nitrite, UA 03/04/2022 NEGATIVE  NEGATIVE Final    Leukocytes, UA 03/04/2022 NEGATIVE  NEGATIVE Final    WBC Casts, UA 03/04/2022 NONE SEEN  < OR = 5 /HPF Final    RBC Casts, UA 03/04/2022 0-2  < OR = 2 /HPF Final    Squam Epithel, UA 03/04/2022 NONE SEEN  < OR = 5 /HPF Final    Bacteria, UA 03/04/2022 NONE SEEN  NONE SEEN /HPF Final    Hyaline Casts, UA 03/04/2022 NONE SEEN  NONE SEEN /LPF Final    Reflexive Urine Culture 03/04/2022    Final       Past Medical History:   Diagnosis Date    Abdominal weakness 6/27/2018    Cancer     Prostate    Heartburn     Hypertension     Prostatic cancer 8/26/2016     Social History     Socioeconomic History    Marital status:    Tobacco Use    Smoking status: Never    Smokeless tobacco: Never   Substance and Sexual Activity    Alcohol use: No    Drug use: No    Sexual activity: Yes     Partners: Female     Past Surgical History:   Procedure Laterality Date    PROSTATE BIOPSY  2015     Family History   Problem Relation Age of Onset    Lung cancer Father     Multiple sclerosis Sister        Review of patient's allergies indicates:   Allergen Reactions    Codeine     Penicillins     Vardenafil        Current Outpatient Medications:     biotin 10,000 mcg Cap, Take by mouth., Disp: , Rfl:     cholecalciferol, vitamin D3, (VITAMIN D3) 25 mcg (1,000 unit) capsule, Take 1,000 Units by mouth once daily., Disp: , Rfl:     fish oil-omega-3 fatty acids 300-1,000 mg capsule, Take 2 g by mouth once daily., Disp: , Rfl:     iron,carbonyl-vitamin C (VITRON-C) 65 mg iron- 125 mg TbEC, Take by mouth., Disp: , Rfl:     magnesium 250 mg Tab, Take by mouth once., Disp: , Rfl:     multivit-min/ferrous fumarate (MULTI VITAMIN ORAL), Take by mouth., Disp: , Rfl:     vit A/C/E ac/ZnOx/cupric oxide (EYE VITAMIN AND MINERALS ORAL), Take by mouth., Disp: , Rfl:     atorvastatin  "(LIPITOR) 40 MG tablet, Take 1 tablet (40 mg total) by mouth once daily., Disp: 90 tablet, Rfl: 3    doxycycline (MONODOX) 100 MG capsule, Take 1 capsule (100 mg total) by mouth 2 (two) times daily. (Patient not taking: Reported on 3/6/2023), Disp: 20 capsule, Rfl: 0    lisinopriL (PRINIVIL,ZESTRIL) 20 MG tablet, Take 1 tablet (20 mg total) by mouth once daily., Disp: 90 tablet, Rfl: 3    traMADoL (ULTRAM) 50 mg tablet, Take 1 tablet (50 mg total) by mouth 2 (two) times daily as needed for Pain. (Patient not taking: Reported on 3/6/2023), Disp: 20 tablet, Rfl: 0    Review of Systems   Constitutional:  Negative for appetite change, chills, fatigue, fever and unexpected weight change.   HENT:  Positive for hearing loss (deafness bilateral). Negative for congestion, ear pain and trouble swallowing.    Eyes:  Negative for pain, discharge and visual disturbance.   Respiratory:  Negative for apnea, cough, shortness of breath and wheezing.    Cardiovascular:  Negative for chest pain and leg swelling.   Gastrointestinal:  Negative for abdominal pain, blood in stool, constipation, diarrhea, nausea and vomiting.   Endocrine: Negative for cold intolerance, heat intolerance and polydipsia.   Genitourinary:  Negative for dysuria, hematuria, testicular pain and urgency.   Musculoskeletal:  Positive for arthralgias (Mild arthritis of the hands and thumbs) and back pain (intermittent low back pain no sciatica). Negative for gait problem, joint swelling and myalgias.   Neurological:  Negative for dizziness, seizures and numbness.   Psychiatric/Behavioral:  Negative for agitation, behavioral problems and hallucinations. The patient is not nervous/anxious.         Objective:      Vitals:    03/06/23 1054   BP: 128/72   Pulse: 80   Weight: 75.3 kg (166 lb)   Height: 5' 7" (1.702 m)     Physical Exam  Vitals and nursing note reviewed.   Constitutional:       General: He is not in acute distress.     Appearance: Normal appearance. He is " well-developed. He is not toxic-appearing.   HENT:      Head: Normocephalic and atraumatic.      Right Ear: Tympanic membrane and external ear normal.      Left Ear: Tympanic membrane and external ear normal.      Nose: Nose normal.      Mouth/Throat:      Pharynx: Oropharynx is clear.   Eyes:      Pupils: Pupils are equal, round, and reactive to light.   Neck:      Thyroid: No thyromegaly.      Vascular: No carotid bruit.   Cardiovascular:      Rate and Rhythm: Normal rate and regular rhythm.      Heart sounds: Normal heart sounds. No murmur heard.  Pulmonary:      Effort: Pulmonary effort is normal.      Breath sounds: Normal breath sounds. No wheezing or rales.   Abdominal:      General: Bowel sounds are normal. There is no distension.      Palpations: Abdomen is soft.      Tenderness: There is no abdominal tenderness.   Musculoskeletal:         General: No tenderness or deformity. Normal range of motion.      Cervical back: Normal range of motion and neck supple.      Lumbar back: Normal. No spasms.      Comments: Bends 90 degrees at  waist, shoulders and knees good range of motion without crepitance.  No pitting edema to lower extremities   Lymphadenopathy:      Cervical: No cervical adenopathy.   Skin:     General: Skin is warm and dry.      Findings: No rash.      Comments: Trunk and arms have numerous moles and seborrheic keratoses.   Neurological:      Mental Status: He is alert and oriented to person, place, and time.      Cranial Nerves: No cranial nerve deficit.      Coordination: Coordination normal.   Psychiatric:         Mood and Affect: Mood normal.         Behavior: Behavior normal.         Thought Content: Thought content normal.         Judgment: Judgment normal.         Assessment:       1. Essential hypertension    2. Mixed hyperlipidemia    3. Bilateral deafness    4. Primary hypertension    5. History of prostate cancer    6. Umbilical hernia without obstruction and without gangrene    7.  Acute right-sided low back pain with right-sided sciatica           Plan:       Essential hypertension  Comments:  Currently stable and well controlled.  Continue lisinopril 20mg.  Refill sent today.  Orders:  -     lisinopriL (PRINIVIL,ZESTRIL) 20 MG tablet; Take 1 tablet (20 mg total) by mouth once daily.  Dispense: 90 tablet; Refill: 3    Mixed hyperlipidemia  Comments:  . Will repeat lab work today prior to leaving office. Continue Lipitor 40mg. Refill sent today.  Orders:  -     atorvastatin (LIPITOR) 40 MG tablet; Take 1 tablet (40 mg total) by mouth once daily.  Dispense: 90 tablet; Refill: 3    Bilateral deafness  Communicated through a  during this visit.  Primary hypertension  Blood pressure well controlled  History of prostate cancer  PSA undetectable, follow-up with Urology yearly  Umbilical hernia without obstruction and without gangrene  Stable  Acute right-sided low back pain with right-sided sciatica  Occasionally uses Advil or Aleve p.r.n.    No follow-ups on file.        3/6/2023 Henry Villaseñor

## 2023-03-06 NOTE — TELEPHONE ENCOUNTER
----- Message from Kallie Mcgovern MA sent at 3/6/2023 12:11 PM CST -----    ----- Message -----  From: Brianna Owens  Sent: 3/6/2023  12:09 PM CST  To: Henry Villaseñor Staff    This patient saw Dr. Villaseñor today. He needs an  for Thursday 09/07/2023 at 10:00. GH

## 2023-03-25 LAB
ALBUMIN SERPL-MCNC: 4.5 G/DL (ref 3.6–5.1)
ALBUMIN/GLOB SERPL: 2.1 (CALC) (ref 1–2.5)
ALP SERPL-CCNC: 46 U/L (ref 35–144)
ALT SERPL-CCNC: 18 U/L (ref 9–46)
AST SERPL-CCNC: 18 U/L (ref 10–35)
BASOPHILS # BLD AUTO: 38 CELLS/UL (ref 0–200)
BASOPHILS NFR BLD AUTO: 0.8 %
BILIRUB SERPL-MCNC: 0.8 MG/DL (ref 0.2–1.2)
BUN SERPL-MCNC: 19 MG/DL (ref 7–25)
BUN/CREAT SERPL: NORMAL (CALC) (ref 6–22)
CALCIUM SERPL-MCNC: 9.6 MG/DL (ref 8.6–10.3)
CHLORIDE SERPL-SCNC: 103 MMOL/L (ref 98–110)
CHOLEST SERPL-MCNC: 148 MG/DL
CHOLEST/HDLC SERPL: 2.7 (CALC)
CO2 SERPL-SCNC: 29 MMOL/L (ref 20–32)
CREAT SERPL-MCNC: 0.93 MG/DL (ref 0.7–1.28)
EGFR: 86 ML/MIN/1.73M2
EOSINOPHIL # BLD AUTO: 394 CELLS/UL (ref 15–500)
EOSINOPHIL NFR BLD AUTO: 8.2 %
ERYTHROCYTE [DISTWIDTH] IN BLOOD BY AUTOMATED COUNT: 12 % (ref 11–15)
GLOBULIN SER CALC-MCNC: 2.1 G/DL (CALC) (ref 1.9–3.7)
GLUCOSE SERPL-MCNC: 97 MG/DL (ref 65–99)
HCT VFR BLD AUTO: 37.5 % (ref 38.5–50)
HDLC SERPL-MCNC: 54 MG/DL
HGB BLD-MCNC: 12.7 G/DL (ref 13.2–17.1)
LDLC SERPL CALC-MCNC: 79 MG/DL (CALC)
LYMPHOCYTES # BLD AUTO: 1800 CELLS/UL (ref 850–3900)
LYMPHOCYTES NFR BLD AUTO: 37.5 %
MCH RBC QN AUTO: 30.5 PG (ref 27–33)
MCHC RBC AUTO-ENTMCNC: 33.9 G/DL (ref 32–36)
MCV RBC AUTO: 89.9 FL (ref 80–100)
MONOCYTES # BLD AUTO: 542 CELLS/UL (ref 200–950)
MONOCYTES NFR BLD AUTO: 11.3 %
NEUTROPHILS # BLD AUTO: 2026 CELLS/UL (ref 1500–7800)
NEUTROPHILS NFR BLD AUTO: 42.2 %
NONHDLC SERPL-MCNC: 94 MG/DL (CALC)
PLATELET # BLD AUTO: 238 THOUSAND/UL (ref 140–400)
PMV BLD REES-ECKER: 9.6 FL (ref 7.5–12.5)
POTASSIUM SERPL-SCNC: 4 MMOL/L (ref 3.5–5.3)
PROT SERPL-MCNC: 6.6 G/DL (ref 6.1–8.1)
RBC # BLD AUTO: 4.17 MILLION/UL (ref 4.2–5.8)
SODIUM SERPL-SCNC: 140 MMOL/L (ref 135–146)
TRIGL SERPL-MCNC: 66 MG/DL
TSH SERPL-ACNC: 1.54 MIU/L (ref 0.4–4.5)
WBC # BLD AUTO: 4.8 THOUSAND/UL (ref 3.8–10.8)

## 2023-04-01 NOTE — PROGRESS NOTES
Call patient.  All his lab work looks excellent and normal.  Cholesterol is down to 148.  Continue current medications

## 2023-04-03 ENCOUNTER — TELEPHONE (OUTPATIENT)
Dept: FAMILY MEDICINE | Facility: CLINIC | Age: 78
End: 2023-04-03

## 2023-04-03 NOTE — TELEPHONE ENCOUNTER
Spoke with pt in regards to recent labs results. Verbalized per Dr. Villaseñor that pt's  lab work looks excellent and normal. Cholesterol is down to 148. Continue current medications. Pt acknowledge understanding.

## 2023-04-03 NOTE — TELEPHONE ENCOUNTER
----- Message from Henry Villaseñor MD sent at 4/1/2023 10:53 AM CDT -----  Call patient.  All his lab work looks excellent and normal.  Cholesterol is down to 148.  Continue current medications

## 2023-09-06 ENCOUNTER — TELEPHONE (OUTPATIENT)
Dept: FAMILY MEDICINE | Facility: CLINIC | Age: 78
End: 2023-09-06

## 2023-09-06 NOTE — TELEPHONE ENCOUNTER
----- Message from Kallie Mcgovern MA sent at 9/6/2023  5:29 PM CDT -----    ----- Message -----  From: Lori Kevin  Sent: 9/6/2023   3:09 PM CDT  To: Henry Villaseñor MD    Pt has an appt on tomorrow and needs an  for sign language [phone]

## 2023-09-07 ENCOUNTER — TELEPHONE (OUTPATIENT)
Dept: FAMILY MEDICINE | Facility: CLINIC | Age: 78
End: 2023-09-07

## 2023-09-07 ENCOUNTER — OFFICE VISIT (OUTPATIENT)
Dept: FAMILY MEDICINE | Facility: CLINIC | Age: 78
End: 2023-09-07
Attending: FAMILY MEDICINE
Payer: MEDICARE

## 2023-09-07 VITALS
DIASTOLIC BLOOD PRESSURE: 70 MMHG | HEART RATE: 84 BPM | BODY MASS INDEX: 26.21 KG/M2 | WEIGHT: 167 LBS | HEIGHT: 67 IN | SYSTOLIC BLOOD PRESSURE: 120 MMHG

## 2023-09-07 DIAGNOSIS — R26.89 DECREASED BACK MOBILITY: ICD-10-CM

## 2023-09-07 DIAGNOSIS — Z85.46 HISTORY OF PROSTATE CANCER: ICD-10-CM

## 2023-09-07 DIAGNOSIS — M54.31 RIGHT SCIATIC NERVE PAIN: ICD-10-CM

## 2023-09-07 DIAGNOSIS — I10 PRIMARY HYPERTENSION: Chronic | ICD-10-CM

## 2023-09-07 DIAGNOSIS — E78.2 MIXED HYPERLIPIDEMIA: ICD-10-CM

## 2023-09-07 DIAGNOSIS — K42.9 UMBILICAL HERNIA WITHOUT OBSTRUCTION AND WITHOUT GANGRENE: ICD-10-CM

## 2023-09-07 DIAGNOSIS — H91.93 BILATERAL DEAFNESS: Primary | ICD-10-CM

## 2023-09-07 PROCEDURE — 1101F PT FALLS ASSESS-DOCD LE1/YR: CPT | Mod: CPTII,S$GLB,, | Performed by: FAMILY MEDICINE

## 2023-09-07 PROCEDURE — 99214 PR OFFICE/OUTPT VISIT, EST, LEVL IV, 30-39 MIN: ICD-10-PCS | Mod: S$GLB,,, | Performed by: FAMILY MEDICINE

## 2023-09-07 PROCEDURE — 3078F DIAST BP <80 MM HG: CPT | Mod: CPTII,S$GLB,, | Performed by: FAMILY MEDICINE

## 2023-09-07 PROCEDURE — 3288F PR FALLS RISK ASSESSMENT DOCUMENTED: ICD-10-PCS | Mod: CPTII,S$GLB,, | Performed by: FAMILY MEDICINE

## 2023-09-07 PROCEDURE — 3074F SYST BP LT 130 MM HG: CPT | Mod: CPTII,S$GLB,, | Performed by: FAMILY MEDICINE

## 2023-09-07 PROCEDURE — 1101F PR PT FALLS ASSESS DOC 0-1 FALLS W/OUT INJ PAST YR: ICD-10-PCS | Mod: CPTII,S$GLB,, | Performed by: FAMILY MEDICINE

## 2023-09-07 PROCEDURE — 99214 OFFICE O/P EST MOD 30 MIN: CPT | Mod: S$GLB,,, | Performed by: FAMILY MEDICINE

## 2023-09-07 PROCEDURE — 3074F PR MOST RECENT SYSTOLIC BLOOD PRESSURE < 130 MM HG: ICD-10-PCS | Mod: CPTII,S$GLB,, | Performed by: FAMILY MEDICINE

## 2023-09-07 PROCEDURE — 3288F FALL RISK ASSESSMENT DOCD: CPT | Mod: CPTII,S$GLB,, | Performed by: FAMILY MEDICINE

## 2023-09-07 PROCEDURE — 3078F PR MOST RECENT DIASTOLIC BLOOD PRESSURE < 80 MM HG: ICD-10-PCS | Mod: CPTII,S$GLB,, | Performed by: FAMILY MEDICINE

## 2023-09-07 RX ORDER — ATORVASTATIN CALCIUM 40 MG/1
40 TABLET, FILM COATED ORAL DAILY
Qty: 90 TABLET | Refills: 3 | Status: SHIPPED | OUTPATIENT
Start: 2023-09-07 | End: 2024-03-18 | Stop reason: SDUPTHER

## 2023-09-07 NOTE — PROGRESS NOTES
SUBJECTIVE:    Patient ID: Robert Jessica is a 78 y.o. male.    Chief Complaint: Hypertension (Brought med list, need refill, declined pna vaccine, no complaints, abc )    78-year-old male who is deaf times many years.  He is retired but still stays active mowing the lawn and doing home chores.  Still able to drive a car.    Right-sided sciatica flares up occasionally.  He rarely takes any Advil or Aleve.  Pickle juice used for leg cramps at night.  Had 1 trip and fall at his home and had a left elbow abrasion and a knee abrasion which are now healing well.    2016-colonoscopy with Dr. Ordonez-RTC 10 years    Osteoarthritis-present in his hands and fingers.  No recent NSAID use.    History of prostate cancer followed by Dr. Hernandez-has appointment 09/11/2023    Has cataracts and plans to see Dr. Pillai ophthalmology 09/14/2023    Has umbilical hernia that is asymptomatic.        No visits with results within 6 Month(s) from this visit.   Latest known visit with results is:   Office Visit on 09/06/2022   Component Date Value Ref Range Status    WBC 03/24/2023 4.8  3.8 - 10.8 Thousand/uL Final    RBC 03/24/2023 4.17 (L)  4.20 - 5.80 Million/uL Final    Hemoglobin 03/24/2023 12.7 (L)  13.2 - 17.1 g/dL Final    Hematocrit 03/24/2023 37.5 (L)  38.5 - 50.0 % Final    MCV 03/24/2023 89.9  80.0 - 100.0 fL Final    MCH 03/24/2023 30.5  27.0 - 33.0 pg Final    MCHC 03/24/2023 33.9  32.0 - 36.0 g/dL Final    RDW 03/24/2023 12.0  11.0 - 15.0 % Final    Platelets 03/24/2023 238  140 - 400 Thousand/uL Final    MPV 03/24/2023 9.6  7.5 - 12.5 fL Final    Neutrophils, Abs 03/24/2023 2,026  1,500 - 7,800 cells/uL Final    Lymph # 03/24/2023 1,800  850 - 3,900 cells/uL Final    Mono # 03/24/2023 542  200 - 950 cells/uL Final    Eos # 03/24/2023 394  15 - 500 cells/uL Final    Baso # 03/24/2023 38  0 - 200 cells/uL Final    Neutrophils Relative 03/24/2023 42.2  % Final    Lymph % 03/24/2023 37.5  % Final    Mono % 03/24/2023 11.3  %  Final    Eosinophil % 03/24/2023 8.2  % Final    Basophil % 03/24/2023 0.8  % Final    Glucose 03/24/2023 97  65 - 99 mg/dL Final    BUN 03/24/2023 19  7 - 25 mg/dL Final    Creatinine 03/24/2023 0.93  0.70 - 1.28 mg/dL Final    eGFR 03/24/2023 86  > OR = 60 mL/min/1.73m2 Final    BUN/Creatinine Ratio 03/24/2023 NOT APPLICABLE  6 - 22 (calc) Final    Sodium 03/24/2023 140  135 - 146 mmol/L Final    Potassium 03/24/2023 4.0  3.5 - 5.3 mmol/L Final    Chloride 03/24/2023 103  98 - 110 mmol/L Final    CO2 03/24/2023 29  20 - 32 mmol/L Final    Calcium 03/24/2023 9.6  8.6 - 10.3 mg/dL Final    Total Protein 03/24/2023 6.6  6.1 - 8.1 g/dL Final    Albumin 03/24/2023 4.5  3.6 - 5.1 g/dL Final    Globulin, Total 03/24/2023 2.1  1.9 - 3.7 g/dL (calc) Final    Albumin/Globulin Ratio 03/24/2023 2.1  1.0 - 2.5 (calc) Final    Total Bilirubin 03/24/2023 0.8  0.2 - 1.2 mg/dL Final    Alkaline Phosphatase 03/24/2023 46  35 - 144 U/L Final    AST 03/24/2023 18  10 - 35 U/L Final    ALT 03/24/2023 18  9 - 46 U/L Final    Cholesterol 03/24/2023 148  <200 mg/dL Final    HDL 03/24/2023 54  > OR = 40 mg/dL Final    Triglycerides 03/24/2023 66  <150 mg/dL Final    LDL Cholesterol 03/24/2023 79  mg/dL (calc) Final    HDL/Cholesterol Ratio 03/24/2023 2.7  <5.0 (calc) Final    Non HDL Chol. (LDL+VLDL) 03/24/2023 94  <130 mg/dL (calc) Final    TSH w/reflex to FT4 03/24/2023 1.54  0.40 - 4.50 mIU/L Final       Past Medical History:   Diagnosis Date    Abdominal weakness 6/27/2018    Cancer     Prostate    Heartburn     Hypertension     Prostatic cancer 8/26/2016     Social History     Socioeconomic History    Marital status:    Tobacco Use    Smoking status: Never    Smokeless tobacco: Never   Substance and Sexual Activity    Alcohol use: No    Drug use: No    Sexual activity: Yes     Partners: Female     Past Surgical History:   Procedure Laterality Date    PROSTATE BIOPSY  2015     Family History   Problem Relation Age of Onset     Lung cancer Father     Multiple sclerosis Sister        The CVD Risk score (Mami, et al., 2008) failed to calculate for the following reasons:    The 2008 CVD risk score is only valid for ages 30 to 74    All of your core healthy metrics are met.      Review of patient's allergies indicates:   Allergen Reactions    Codeine     Penicillins     Vardenafil        Current Outpatient Medications:     cholecalciferol, vitamin D3, (VITAMIN D3) 25 mcg (1,000 unit) capsule, Take 1,000 Units by mouth once daily., Disp: , Rfl:     fish oil-omega-3 fatty acids 300-1,000 mg capsule, Take 2 g by mouth once daily., Disp: , Rfl:     lisinopriL (PRINIVIL,ZESTRIL) 20 MG tablet, Take 1 tablet (20 mg total) by mouth once daily., Disp: 90 tablet, Rfl: 3    magnesium 250 mg Tab, Take by mouth once., Disp: , Rfl:     vit A/C/E ac/ZnOx/cupric oxide (EYE VITAMIN AND MINERALS ORAL), Take by mouth., Disp: , Rfl:     atorvastatin (LIPITOR) 40 MG tablet, Take 1 tablet (40 mg total) by mouth once daily., Disp: 90 tablet, Rfl: 3    biotin 10,000 mcg Cap, Take by mouth., Disp: , Rfl:     doxycycline (MONODOX) 100 MG capsule, Take 1 capsule (100 mg total) by mouth 2 (two) times daily. (Patient not taking: Reported on 3/6/2023), Disp: 20 capsule, Rfl: 0    iron,carbonyl-vitamin C (VITRON-C) 65 mg iron- 125 mg TbEC, Take by mouth., Disp: , Rfl:     multivit-min/ferrous fumarate (MULTI VITAMIN ORAL), Take by mouth., Disp: , Rfl:     traMADoL (ULTRAM) 50 mg tablet, Take 1 tablet (50 mg total) by mouth 2 (two) times daily as needed for Pain. (Patient not taking: Reported on 3/6/2023), Disp: 20 tablet, Rfl: 0    Review of Systems   Constitutional:  Negative for appetite change, chills, fatigue, fever and unexpected weight change.   HENT:  Positive for hearing loss (completely deaf uses ). Negative for ear pain and trouble swallowing.    Eyes:  Negative for pain, discharge and visual disturbance.   Respiratory:  Negative for  "apnea, cough, shortness of breath and wheezing.    Cardiovascular:  Negative for chest pain and leg swelling.   Gastrointestinal:  Negative for abdominal pain, blood in stool, constipation, diarrhea, nausea, vomiting and reflux.   Endocrine: Negative for cold intolerance, heat intolerance and polydipsia.   Genitourinary:  Negative for bladder incontinence, dysuria, erectile dysfunction, frequency, hematuria, testicular pain and urgency.   Musculoskeletal:  Negative for gait problem, joint swelling and myalgias.   Neurological:  Negative for dizziness, seizures and numbness.   Psychiatric/Behavioral:  Negative for agitation, behavioral problems and hallucinations. The patient is not nervous/anxious.            Objective:      Vitals:    09/07/23 1014   BP: 120/70   Pulse: 84   Weight: 75.8 kg (167 lb)   Height: 5' 7" (1.702 m)     Physical Exam  Vitals and nursing note reviewed. Exam conducted with a chaperone present.   Constitutional:       General: He is not in acute distress.     Appearance: He is well-developed. He is not toxic-appearing.      Comments: Communicates through  today   HENT:      Head: Normocephalic and atraumatic.      Right Ear: Tympanic membrane and external ear normal.      Left Ear: Tympanic membrane and external ear normal.      Nose: Nose normal.      Mouth/Throat:      Pharynx: Oropharynx is clear.   Eyes:      Pupils: Pupils are equal, round, and reactive to light.   Neck:      Thyroid: No thyromegaly.      Vascular: No carotid bruit.   Cardiovascular:      Rate and Rhythm: Normal rate and regular rhythm.      Heart sounds: Normal heart sounds. No murmur heard.  Pulmonary:      Effort: Pulmonary effort is normal.      Breath sounds: Normal breath sounds. No wheezing or rales.   Abdominal:      General: Bowel sounds are normal. There is no distension.      Palpations: Abdomen is soft.      Tenderness: There is no abdominal tenderness.      Hernia: A hernia " (reducible umbilical hernia is small.) is present.   Musculoskeletal:         General: No tenderness or deformity. Normal range of motion.      Cervical back: Normal range of motion and neck supple.      Lumbar back: Normal. No spasms.      Comments: Bends 90 degrees at  waist, shoulders and knees good range of motion without crepitance.  No pitting edema to lower extremities   Lymphadenopathy:      Cervical: No cervical adenopathy.   Skin:     General: Skin is warm and dry.      Findings: No rash.   Neurological:      Mental Status: He is alert and oriented to person, place, and time.      Cranial Nerves: No cranial nerve deficit.      Coordination: Coordination normal.      Gait: Gait normal.   Psychiatric:         Mood and Affect: Mood normal.         Behavior: Behavior normal.         Thought Content: Thought content normal.         Judgment: Judgment normal.           Assessment:       1. Bilateral deafness    2. Mixed hyperlipidemia    3. Primary hypertension    4. History of prostate cancer    5. Umbilical hernia without obstruction and without gangrene    6. Right sciatic nerve pain    7. Decreased back mobility         Plan:       Bilateral deafness  Continues to use sign language to communicate.  Mixed hyperlipidemia  Comments:  Continue Lipitor 40mg. Refill sent today.  Orders:  -     atorvastatin (LIPITOR) 40 MG tablet; Take 1 tablet (40 mg total) by mouth once daily.  Dispense: 90 tablet; Refill: 3  -     CBC Auto Differential; Future; Expected date: 09/07/2023  -     Comprehensive Metabolic Panel; Future; Expected date: 09/07/2023  -     Lipid Panel; Future; Expected date: 09/07/2023  Lipid panel due next week  Primary hypertension  Blood pressure well controlled  History of prostate cancer  Yearly PSA due with urology next month  Umbilical hernia without obstruction and without gangrene  No surgery required umbilical hernias  Right sciatic nerve pain    Decreased back mobility      Follow up in about  6 months (around 3/7/2024), or Hyperlipidemia, right-sided sciatica.        9/7/2023 Henry Villaseñor

## 2023-09-07 NOTE — TELEPHONE ENCOUNTER
----- Message from Kallie Mcgovern MA sent at 9/7/2023 11:02 AM CDT -----    ----- Message -----  From: Bren Howard MA  Sent: 9/7/2023  10:56 AM CDT  To: Henry Villaseñor Staff    [phone] Patient is requesting an  for his next return visit on 03/18/23

## 2023-09-16 LAB
ALBUMIN SERPL-MCNC: 4.3 G/DL (ref 3.6–5.1)
ALBUMIN/GLOB SERPL: 2 (CALC) (ref 1–2.5)
ALP SERPL-CCNC: 45 U/L (ref 35–144)
ALT SERPL-CCNC: 24 U/L (ref 9–46)
AST SERPL-CCNC: 17 U/L (ref 10–35)
BASOPHILS # BLD AUTO: 28 CELLS/UL (ref 0–200)
BASOPHILS NFR BLD AUTO: 0.6 %
BILIRUB SERPL-MCNC: 0.5 MG/DL (ref 0.2–1.2)
BUN SERPL-MCNC: 22 MG/DL (ref 7–25)
BUN/CREAT SERPL: ABNORMAL (CALC) (ref 6–22)
CALCIUM SERPL-MCNC: 9.6 MG/DL (ref 8.6–10.3)
CHLORIDE SERPL-SCNC: 104 MMOL/L (ref 98–110)
CHOLEST SERPL-MCNC: 148 MG/DL
CHOLEST/HDLC SERPL: 2.7 (CALC)
CO2 SERPL-SCNC: 31 MMOL/L (ref 20–32)
CREAT SERPL-MCNC: 1.03 MG/DL (ref 0.7–1.28)
EGFR: 75 ML/MIN/1.73M2
EOSINOPHIL # BLD AUTO: 377 CELLS/UL (ref 15–500)
EOSINOPHIL NFR BLD AUTO: 8.2 %
ERYTHROCYTE [DISTWIDTH] IN BLOOD BY AUTOMATED COUNT: 12 % (ref 11–15)
GLOBULIN SER CALC-MCNC: 2.1 G/DL (CALC) (ref 1.9–3.7)
GLUCOSE SERPL-MCNC: 100 MG/DL (ref 65–99)
HCT VFR BLD AUTO: 38.8 % (ref 38.5–50)
HDLC SERPL-MCNC: 54 MG/DL
HGB BLD-MCNC: 12.7 G/DL (ref 13.2–17.1)
LDLC SERPL CALC-MCNC: 79 MG/DL (CALC)
LYMPHOCYTES # BLD AUTO: 1780 CELLS/UL (ref 850–3900)
LYMPHOCYTES NFR BLD AUTO: 38.7 %
MCH RBC QN AUTO: 30.9 PG (ref 27–33)
MCHC RBC AUTO-ENTMCNC: 32.7 G/DL (ref 32–36)
MCV RBC AUTO: 94.4 FL (ref 80–100)
MONOCYTES # BLD AUTO: 538 CELLS/UL (ref 200–950)
MONOCYTES NFR BLD AUTO: 11.7 %
NEUTROPHILS # BLD AUTO: 1877 CELLS/UL (ref 1500–7800)
NEUTROPHILS NFR BLD AUTO: 40.8 %
NONHDLC SERPL-MCNC: 94 MG/DL (CALC)
PLATELET # BLD AUTO: 225 THOUSAND/UL (ref 140–400)
PMV BLD REES-ECKER: 9.8 FL (ref 7.5–12.5)
POTASSIUM SERPL-SCNC: 4.4 MMOL/L (ref 3.5–5.3)
PROT SERPL-MCNC: 6.4 G/DL (ref 6.1–8.1)
RBC # BLD AUTO: 4.11 MILLION/UL (ref 4.2–5.8)
SODIUM SERPL-SCNC: 139 MMOL/L (ref 135–146)
TRIGL SERPL-MCNC: 69 MG/DL
WBC # BLD AUTO: 4.6 THOUSAND/UL (ref 3.8–10.8)

## 2023-09-17 NOTE — PROGRESS NOTES
Please notify patient by phone or by portal.  He is deaf.  His lab work looks excellent.  He has no anemia.  Sugar kidneys and liver looked normal.  Cholesterol excellent at 148.  Continue current medications.

## 2023-09-18 ENCOUNTER — TELEPHONE (OUTPATIENT)
Dept: FAMILY MEDICINE | Facility: CLINIC | Age: 78
End: 2023-09-18

## 2023-09-18 NOTE — TELEPHONE ENCOUNTER
----- Message from Henry Villaseñor MD sent at 9/17/2023  2:14 PM CDT -----  Please notify patient by phone or by portal.  He is deaf.  His lab work looks excellent.  He has no anemia.  Sugar kidneys and liver looked normal.  Cholesterol excellent at 148.  Continue current medications.

## 2024-02-27 DIAGNOSIS — Z00.00 ENCOUNTER FOR MEDICARE ANNUAL WELLNESS EXAM: ICD-10-CM

## 2024-02-29 ENCOUNTER — TELEPHONE (OUTPATIENT)
Dept: FAMILY MEDICINE | Facility: CLINIC | Age: 79
End: 2024-02-29
Payer: MEDICARE

## 2024-02-29 DIAGNOSIS — I10 PRIMARY HYPERTENSION: ICD-10-CM

## 2024-02-29 DIAGNOSIS — E78.2 MIXED HYPERLIPIDEMIA: ICD-10-CM

## 2024-02-29 DIAGNOSIS — Z79.899 ENCOUNTER FOR LONG-TERM (CURRENT) USE OF OTHER MEDICATIONS: Primary | ICD-10-CM

## 2024-03-16 LAB
ALBUMIN SERPL-MCNC: 4.3 G/DL (ref 3.6–5.1)
ALBUMIN/GLOB SERPL: 2 (CALC) (ref 1–2.5)
ALP SERPL-CCNC: 50 U/L (ref 35–144)
ALT SERPL-CCNC: 23 U/L (ref 9–46)
AST SERPL-CCNC: 21 U/L (ref 10–35)
BASOPHILS # BLD AUTO: 40 CELLS/UL (ref 0–200)
BASOPHILS NFR BLD AUTO: 0.8 %
BILIRUB SERPL-MCNC: 0.5 MG/DL (ref 0.2–1.2)
BUN SERPL-MCNC: 24 MG/DL (ref 7–25)
BUN/CREAT SERPL: NORMAL (CALC) (ref 6–22)
CALCIUM SERPL-MCNC: 9.7 MG/DL (ref 8.6–10.3)
CHLORIDE SERPL-SCNC: 101 MMOL/L (ref 98–110)
CHOLEST SERPL-MCNC: 140 MG/DL
CHOLEST/HDLC SERPL: 3.1 (CALC)
CO2 SERPL-SCNC: 32 MMOL/L (ref 20–32)
CREAT SERPL-MCNC: 0.9 MG/DL (ref 0.7–1.28)
EGFR: 89 ML/MIN/1.73M2
EOSINOPHIL # BLD AUTO: 435 CELLS/UL (ref 15–500)
EOSINOPHIL NFR BLD AUTO: 8.7 %
ERYTHROCYTE [DISTWIDTH] IN BLOOD BY AUTOMATED COUNT: 11.8 % (ref 11–15)
GLOBULIN SER CALC-MCNC: 2.1 G/DL (CALC) (ref 1.9–3.7)
GLUCOSE SERPL-MCNC: 96 MG/DL (ref 65–99)
HCT VFR BLD AUTO: 38.7 % (ref 38.5–50)
HDLC SERPL-MCNC: 45 MG/DL
HGB BLD-MCNC: 12.6 G/DL (ref 13.2–17.1)
LDLC SERPL CALC-MCNC: 79 MG/DL (CALC)
LYMPHOCYTES # BLD AUTO: 2115 CELLS/UL (ref 850–3900)
LYMPHOCYTES NFR BLD AUTO: 42.3 %
MCH RBC QN AUTO: 30.3 PG (ref 27–33)
MCHC RBC AUTO-ENTMCNC: 32.6 G/DL (ref 32–36)
MCV RBC AUTO: 93 FL (ref 80–100)
MONOCYTES # BLD AUTO: 535 CELLS/UL (ref 200–950)
MONOCYTES NFR BLD AUTO: 10.7 %
NEUTROPHILS # BLD AUTO: 1875 CELLS/UL (ref 1500–7800)
NEUTROPHILS NFR BLD AUTO: 37.5 %
NONHDLC SERPL-MCNC: 95 MG/DL (CALC)
PLATELET # BLD AUTO: 251 THOUSAND/UL (ref 140–400)
PMV BLD REES-ECKER: 9.6 FL (ref 7.5–12.5)
POTASSIUM SERPL-SCNC: 4.1 MMOL/L (ref 3.5–5.3)
PROT SERPL-MCNC: 6.4 G/DL (ref 6.1–8.1)
RBC # BLD AUTO: 4.16 MILLION/UL (ref 4.2–5.8)
SODIUM SERPL-SCNC: 138 MMOL/L (ref 135–146)
TRIGL SERPL-MCNC: 82 MG/DL
TSH SERPL-ACNC: 1.22 MIU/L (ref 0.4–4.5)
WBC # BLD AUTO: 5 THOUSAND/UL (ref 3.8–10.8)

## 2024-03-18 ENCOUNTER — OFFICE VISIT (OUTPATIENT)
Dept: FAMILY MEDICINE | Facility: CLINIC | Age: 79
End: 2024-03-18
Attending: FAMILY MEDICINE
Payer: MEDICARE

## 2024-03-18 VITALS
SYSTOLIC BLOOD PRESSURE: 130 MMHG | HEART RATE: 84 BPM | BODY MASS INDEX: 26.21 KG/M2 | HEIGHT: 67 IN | DIASTOLIC BLOOD PRESSURE: 64 MMHG | WEIGHT: 167 LBS

## 2024-03-18 DIAGNOSIS — K04.7 DENTAL ABSCESS: ICD-10-CM

## 2024-03-18 DIAGNOSIS — Z85.46 HISTORY OF PROSTATE CANCER: ICD-10-CM

## 2024-03-18 DIAGNOSIS — K42.9 UMBILICAL HERNIA WITHOUT OBSTRUCTION AND WITHOUT GANGRENE: ICD-10-CM

## 2024-03-18 DIAGNOSIS — L72.3 SEBACEOUS CYST: ICD-10-CM

## 2024-03-18 DIAGNOSIS — I10 ESSENTIAL HYPERTENSION: Primary | ICD-10-CM

## 2024-03-18 DIAGNOSIS — E78.2 MIXED HYPERLIPIDEMIA: ICD-10-CM

## 2024-03-18 DIAGNOSIS — C61 MALIGNANT NEOPLASM OF PROSTATE: ICD-10-CM

## 2024-03-18 DIAGNOSIS — H91.93 BILATERAL DEAFNESS: ICD-10-CM

## 2024-03-18 DIAGNOSIS — I10 PRIMARY HYPERTENSION: Chronic | ICD-10-CM

## 2024-03-18 DIAGNOSIS — M17.12 ARTHRITIS OF LEFT KNEE: ICD-10-CM

## 2024-03-18 PROCEDURE — 3078F DIAST BP <80 MM HG: CPT | Mod: CPTII,S$GLB,, | Performed by: FAMILY MEDICINE

## 2024-03-18 PROCEDURE — 1101F PT FALLS ASSESS-DOCD LE1/YR: CPT | Mod: CPTII,S$GLB,, | Performed by: FAMILY MEDICINE

## 2024-03-18 PROCEDURE — 99214 OFFICE O/P EST MOD 30 MIN: CPT | Mod: S$GLB,,, | Performed by: FAMILY MEDICINE

## 2024-03-18 PROCEDURE — 1159F MED LIST DOCD IN RCRD: CPT | Mod: CPTII,S$GLB,, | Performed by: FAMILY MEDICINE

## 2024-03-18 PROCEDURE — 1125F AMNT PAIN NOTED PAIN PRSNT: CPT | Mod: CPTII,S$GLB,, | Performed by: FAMILY MEDICINE

## 2024-03-18 PROCEDURE — 3288F FALL RISK ASSESSMENT DOCD: CPT | Mod: CPTII,S$GLB,, | Performed by: FAMILY MEDICINE

## 2024-03-18 PROCEDURE — 3075F SYST BP GE 130 - 139MM HG: CPT | Mod: CPTII,S$GLB,, | Performed by: FAMILY MEDICINE

## 2024-03-18 RX ORDER — ATORVASTATIN CALCIUM 40 MG/1
40 TABLET, FILM COATED ORAL DAILY
Qty: 90 TABLET | Refills: 3 | Status: SHIPPED | OUTPATIENT
Start: 2024-03-18 | End: 2024-04-17

## 2024-03-18 RX ORDER — LISINOPRIL 20 MG/1
20 TABLET ORAL DAILY
Qty: 90 TABLET | Refills: 3 | Status: SHIPPED | OUTPATIENT
Start: 2024-03-18

## 2024-03-18 NOTE — PROGRESS NOTES
SUBJECTIVE:    Patient ID: Robert Jessica is a 78 y.o. male.    Chief Complaint: Hypertension (Brought med list, need refills, left knee pain, neck mass discomfort for one week, abc )    This 78-year-old male has deafness and is here with his ./and wife.  He has been relatively healthy lately and has had no upper respiratory infections chest pain or fever.  He complains of his left knee arthritis popping and aching at times.  He wears a knee sleeve on it when needed.  He does not take medications for his knee or his arthritis.  He is able to walk for 20 minutes in [employer].    Prostate cancer-status post prostatectomy and follows up with Dr. Hernandez yearly, PSAs have been undetectable.    Umbilical hernia has no pain.  He does not desire surgery at this time.    2016-colonoscopy with Dr. Ordonez-[facility] 10 years    Noticed a cyst on the back of his neck that is enlarging and occasionally drains.    Hypertension-managed well with lisinopril    Hyperlipidemia-currently on atorvastatin 40 mg daily /fish oil capsules twice a day    Hypertension  Pertinent negatives include no chest pain or shortness of breath.       Telephone on 02/29/2024   Component Date Value Ref Range Status    TSH w/reflex to FT4 03/15/2024 1.22  0.40 - 4.50 mIU/L Final    Cholesterol 03/15/2024 140  <200 mg/dL Final    HDL 03/15/2024 45  > OR = 40 mg/dL Final    Triglycerides 03/15/2024 82  <150 mg/dL Final    LDL Cholesterol 03/15/2024 79  mg/dL (calc) Final    HDL/Cholesterol Ratio 03/15/2024 3.1  <5.0 (calc) Final    Non HDL Chol. (LDL+VLDL) 03/15/2024 95  <130 mg/dL (calc) Final    Glucose 03/15/2024 96  65 - 99 mg/dL Final    BUN 03/15/2024 24  7 - 25 mg/dL Final    Creatinine 03/15/2024 0.90  0.70 - 1.28 mg/dL Final    eGFR 03/15/2024 89  > OR = 60 mL/min/1.73m2 Final    BUN/Creatinine Ratio 03/15/2024 SEE NOTE:  6 - 22 (calc) Final    Sodium 03/15/2024 138  135 - 146 mmol/L Final    Potassium 03/15/2024 4.1  3.5 - 5.3 mmol/L Final     Chloride 03/15/2024 101  98 - 110 mmol/L Final    CO2 03/15/2024 32  20 - 32 mmol/L Final    Calcium 03/15/2024 9.7  8.6 - 10.3 mg/dL Final    Total Protein 03/15/2024 6.4  6.1 - 8.1 g/dL Final    Albumin 03/15/2024 4.3  3.6 - 5.1 g/dL Final    Globulin, Total 03/15/2024 2.1  1.9 - 3.7 g/dL (calc) Final    Albumin/Globulin Ratio 03/15/2024 2.0  1.0 - 2.5 (calc) Final    Total Bilirubin 03/15/2024 0.5  0.2 - 1.2 mg/dL Final    Alkaline Phosphatase 03/15/2024 50  35 - 144 U/L Final    AST 03/15/2024 21  10 - 35 U/L Final    ALT 03/15/2024 23  9 - 46 U/L Final    WBC 03/15/2024 5.0  3.8 - 10.8 Thousand/uL Final    RBC 03/15/2024 4.16 (L)  4.20 - 5.80 Million/uL Final    Hemoglobin 03/15/2024 12.6 (L)  13.2 - 17.1 g/dL Final    Hematocrit 03/15/2024 38.7  38.5 - 50.0 % Final    MCV 03/15/2024 93.0  80.0 - 100.0 fL Final    MCH 03/15/2024 30.3  27.0 - 33.0 pg Final    MCHC 03/15/2024 32.6  32.0 - 36.0 g/dL Final    RDW 03/15/2024 11.8  11.0 - 15.0 % Final    Platelets 03/15/2024 251  140 - 400 Thousand/uL Final    MPV 03/15/2024 9.6  7.5 - 12.5 fL Final    Neutrophils, Abs 03/15/2024 1,875  1,500 - 7,800 cells/uL Final    Lymph # 03/15/2024 2,115  850 - 3,900 cells/uL Final    Mono # 03/15/2024 535  200 - 950 cells/uL Final    Eos # 03/15/2024 435  15 - 500 cells/uL Final    Baso # 03/15/2024 40  0 - 200 cells/uL Final    Neutrophils Relative 03/15/2024 37.5  % Final    Lymph % 03/15/2024 42.3  % Final    Mono % 03/15/2024 10.7  % Final    Eosinophil % 03/15/2024 8.7  % Final    Basophil % 03/15/2024 0.8  % Final       Past Medical History:   Diagnosis Date    Abdominal weakness 6/27/2018    Cancer     Prostate    Heartburn     Hypertension     Prostatic cancer 8/26/2016     Social History     Socioeconomic History    Marital status:    Tobacco Use    Smoking status: Never    Smokeless tobacco: Never   Substance and Sexual Activity    Alcohol use: No    Drug use: No    Sexual activity: Yes     Partners: Female      Past Surgical History:   Procedure Laterality Date    PROSTATE BIOPSY  2015     Family History   Problem Relation Age of Onset    Lung cancer Father     Multiple sclerosis Sister        The CVD Risk score (VIRAL'Ki, et al., 2008) failed to calculate for the following reasons:    The 2008 CVD risk score is only valid for ages 30 to 74    All of your core healthy metrics are met.      Review of patient's allergies indicates:   Allergen Reactions    Codeine     Penicillins     Vardenafil        Current Outpatient Medications:     biotin 10,000 mcg Cap, Take by mouth., Disp: , Rfl:     cholecalciferol, vitamin D3, (VITAMIN D3) 25 mcg (1,000 unit) capsule, Take 1,000 Units by mouth once daily., Disp: , Rfl:     fish oil-omega-3 fatty acids 300-1,000 mg capsule, Take 2 g by mouth once daily., Disp: , Rfl:     iron,carbonyl-vitamin C (VITRON-C) 65 mg iron- 125 mg TbEC, Take by mouth., Disp: , Rfl:     magnesium 250 mg Tab, Take by mouth once., Disp: , Rfl:     multivit-min/ferrous fumarate (MULTI VITAMIN ORAL), Take by mouth., Disp: , Rfl:     traMADoL (ULTRAM) 50 mg tablet, Take 1 tablet (50 mg total) by mouth 2 (two) times daily as needed for Pain., Disp: 20 tablet, Rfl: 0    vit A/C/E ac/ZnOx/cupric oxide (EYE VITAMIN AND MINERALS ORAL), Take by mouth., Disp: , Rfl:     atorvastatin (LIPITOR) 40 MG tablet, Take 1 tablet (40 mg total) by mouth once daily., Disp: 90 tablet, Rfl: 3    doxycycline (MONODOX) 100 MG capsule, Take 1 capsule (100 mg total) by mouth 2 (two) times daily. (Patient not taking: Reported on 3/6/2023), Disp: 20 capsule, Rfl: 0    lisinopriL (PRINIVIL,ZESTRIL) 20 MG tablet, Take 1 tablet (20 mg total) by mouth once daily., Disp: 90 tablet, Rfl: 3    Review of Systems   Constitutional:  Negative for appetite change, chills, fatigue, fever and unexpected weight change.   HENT:  Positive for hearing loss (Chronic deafness communicates with sign language for lip reading). Negative for ear pain and  "trouble swallowing.    Eyes:  Negative for pain, discharge and visual disturbance.   Respiratory:  Negative for apnea, cough, shortness of breath and wheezing.    Cardiovascular:  Negative for chest pain and leg swelling.   Gastrointestinal:  Negative for abdominal pain, blood in stool, constipation, diarrhea, nausea, vomiting and reflux.   Endocrine: Negative for cold intolerance, heat intolerance and polydipsia.   Genitourinary:  Negative for bladder incontinence, dysuria, erectile dysfunction, frequency, hematuria, testicular pain and urgency.   Musculoskeletal:  Positive for arthralgias (left knee has arthritic pain intermittently with morning stiffness). Negative for gait problem, joint swelling and myalgias.   Neurological:  Negative for dizziness, seizures and numbness.   Psychiatric/Behavioral:  Negative for agitation, behavioral problems and hallucinations. The patient is not nervous/anxious.            Objective:      Vitals:    03/18/24 1401   BP: 130/64   Pulse: 84   Weight: 75.8 kg (167 lb)   Height: 5' 7" (1.702 m)     Physical Exam  Vitals and nursing note reviewed.   Constitutional:       General: He is not in acute distress.     Appearance: Normal appearance. He is well-developed. He is not toxic-appearing.   HENT:      Head: Normocephalic and atraumatic.      Right Ear: Tympanic membrane and external ear normal.      Left Ear: Tympanic membrane and external ear normal.      Ears:      Comments: Patient is deaf and communicates by sign language and  lip reading     Nose: Nose normal.   Eyes:      Pupils: Pupils are equal, round, and reactive to light.   Neck:      Thyroid: No thyromegaly.      Vascular: No carotid bruit.   Cardiovascular:      Rate and Rhythm: Normal rate and regular rhythm.      Heart sounds: Normal heart sounds. No murmur heard.  Pulmonary:      Effort: Pulmonary effort is normal.      Breath sounds: Normal breath sounds. No wheezing or rales.   Abdominal:      General: Bowel " sounds are normal. There is no distension.      Palpations: Abdomen is soft.      Tenderness: There is no abdominal tenderness.      Hernia: A hernia (small reducible umbilical hernia noted) is present.   Musculoskeletal:         General: No tenderness or deformity. Normal range of motion.      Cervical back: Normal range of motion and neck supple.      Lumbar back: Normal. No spasms.      Comments: Bends 90 degrees at  waist shoulders good range of motion, left knee is crepitant but has good range of motion.  He walks without a limp.  No pitting edema to lower extremities   Lymphadenopathy:      Cervical: No cervical adenopathy.   Skin:     General: Skin is warm and dry.      Findings: No rash.      Comments: Small sebaceous cyst on the nape of his neck.  Rather flat but irritated looking   Neurological:      Mental Status: He is alert and oriented to person, place, and time.      Cranial Nerves: No cranial nerve deficit.      Coordination: Coordination normal.   Psychiatric:         Mood and Affect: Mood normal.         Behavior: Behavior normal.         Thought Content: Thought content normal.         Judgment: Judgment normal.           Assessment:       1. Essential hypertension    2. Mixed hyperlipidemia    3. Dental abscess    4. Sebaceous cyst    5. Bilateral deafness    6. Primary hypertension    7. History of prostate cancer    8. Umbilical hernia without obstruction and without gangrene    9. Arthritis of left knee    10. Malignant neoplasm of prostate         Plan:       Essential hypertension  Comments:  Currently stable and well controlled.  Continue lisinopril 20mg.  Refill sent today.  Orders:  -     lisinopriL (PRINIVIL,ZESTRIL) 20 MG tablet; Take 1 tablet (20 mg total) by mouth once daily.  Dispense: 90 tablet; Refill: 3    Mixed hyperlipidemia  Comments:   Continue Lipitor 40mg. Refill sent today.  Orders:  -     atorvastatin (LIPITOR) 40 MG tablet; Take 1 tablet (40 mg total) by mouth once daily.   Dispense: 90 tablet; Refill: 3  Cholesterol 140 HDL 45 TG 82 LDL 79 excellent lipid  Dental abscess    Sebaceous cyst  Nape of neck has irritated sebaceous cyst, may need dermatologist if this gets larger  Bilateral deafness  Doing well at this time  Primary hypertension  Blood pressure well controlled  Histo blood pressure well controlled ry of prostate cancer    Umbilical hernia without obstruction and without gangrene    Arthritis of left knee  Conservative treatment only.  Malignant neoplasm of prostate  In remission for many years  Recommend Shingrix vaccine at a local pharmacy    No follow-ups on file.        3/18/2024 Henry Villaseñor

## 2024-09-12 ENCOUNTER — TELEPHONE (OUTPATIENT)
Dept: FAMILY MEDICINE | Facility: CLINIC | Age: 79
End: 2024-09-12
Payer: MEDICARE

## 2024-09-12 DIAGNOSIS — I10 ESSENTIAL HYPERTENSION: ICD-10-CM

## 2024-09-12 DIAGNOSIS — Z79.899 ENCOUNTER FOR LONG-TERM (CURRENT) USE OF OTHER MEDICATIONS: Primary | ICD-10-CM

## 2024-09-12 DIAGNOSIS — E78.2 MIXED HYPERLIPIDEMIA: ICD-10-CM

## 2024-09-16 ENCOUNTER — TELEPHONE (OUTPATIENT)
Dept: FAMILY MEDICINE | Facility: CLINIC | Age: 79
End: 2024-09-16
Payer: MEDICARE

## 2024-09-23 ENCOUNTER — OFFICE VISIT (OUTPATIENT)
Dept: FAMILY MEDICINE | Facility: CLINIC | Age: 79
End: 2024-09-23
Payer: MEDICARE

## 2024-09-23 ENCOUNTER — TELEPHONE (OUTPATIENT)
Dept: FAMILY MEDICINE | Facility: CLINIC | Age: 79
End: 2024-09-23

## 2024-09-23 VITALS
BODY MASS INDEX: 26.06 KG/M2 | DIASTOLIC BLOOD PRESSURE: 60 MMHG | HEIGHT: 67 IN | HEART RATE: 96 BPM | WEIGHT: 166 LBS | SYSTOLIC BLOOD PRESSURE: 120 MMHG

## 2024-09-23 DIAGNOSIS — M54.41 ACUTE RIGHT-SIDED LOW BACK PAIN WITH RIGHT-SIDED SCIATICA: ICD-10-CM

## 2024-09-23 DIAGNOSIS — I10 PRIMARY HYPERTENSION: Chronic | ICD-10-CM

## 2024-09-23 DIAGNOSIS — Z85.46 HISTORY OF PROSTATE CANCER: ICD-10-CM

## 2024-09-23 DIAGNOSIS — E78.2 MIXED HYPERLIPIDEMIA: ICD-10-CM

## 2024-09-23 DIAGNOSIS — K42.9 UMBILICAL HERNIA WITHOUT OBSTRUCTION AND WITHOUT GANGRENE: ICD-10-CM

## 2024-09-23 DIAGNOSIS — M54.31 RIGHT SCIATIC NERVE PAIN: ICD-10-CM

## 2024-09-23 DIAGNOSIS — H91.93 BILATERAL DEAFNESS: Primary | ICD-10-CM

## 2024-09-23 PROCEDURE — 3078F DIAST BP <80 MM HG: CPT | Mod: CPTII,S$GLB,, | Performed by: FAMILY MEDICINE

## 2024-09-23 PROCEDURE — 1159F MED LIST DOCD IN RCRD: CPT | Mod: CPTII,S$GLB,, | Performed by: FAMILY MEDICINE

## 2024-09-23 PROCEDURE — 1101F PT FALLS ASSESS-DOCD LE1/YR: CPT | Mod: CPTII,S$GLB,, | Performed by: FAMILY MEDICINE

## 2024-09-23 PROCEDURE — 3074F SYST BP LT 130 MM HG: CPT | Mod: CPTII,S$GLB,, | Performed by: FAMILY MEDICINE

## 2024-09-23 PROCEDURE — 3288F FALL RISK ASSESSMENT DOCD: CPT | Mod: CPTII,S$GLB,, | Performed by: FAMILY MEDICINE

## 2024-09-23 PROCEDURE — 99214 OFFICE O/P EST MOD 30 MIN: CPT | Mod: S$GLB,,, | Performed by: FAMILY MEDICINE

## 2024-09-23 NOTE — PROGRESS NOTES
SUBJECTIVE:    Patient ID: Robert Jessica is a 79 y.o. male.    Chief Complaint: Follow-up (Brought bottles, declined flu vaccine, review Lab-results, abc )    79-year-old male here for six-month checkup.  He is deaf communicates by reading lips.  He can still talk and speak his needs.  He is active with cutting the grass and edging, no chest pain or shortness a breath with activity.  He admits to a slight cough.    Ears-he has been deaf for many years, he has tinnitus in his ears    Low back pains or aching when doing heavy lifting, right leg sciatica intermittently, he walks well and [employer].  He has headaches occasionally and takes Tylenol for it, he denies any recent falls    He sleeps well.    Has a umbilical hernia that is asymptomatic        Follow-up  Pertinent negatives include no abdominal pain, chest pain, chills, coughing, fatigue, fever, joint swelling, myalgias, nausea, numbness or vomiting.       Telephone on 09/12/2024   Component Date Value Ref Range Status    Cholesterol 09/17/2024 148  <200 mg/dL Final    HDL 09/17/2024 44  > OR = 40 mg/dL Final    Triglycerides 09/17/2024 74  <150 mg/dL Final    LDL Cholesterol 09/17/2024 88  mg/dL (calc) Final    HDL/Cholesterol Ratio 09/17/2024 3.4  <5.0 (calc) Final    Non HDL Chol. (LDL+VLDL) 09/17/2024 104  <130 mg/dL (calc) Final    Glucose 09/17/2024 92  65 - 99 mg/dL Final    BUN 09/17/2024 19  7 - 25 mg/dL Final    Creatinine 09/17/2024 0.93  0.70 - 1.28 mg/dL Final    eGFR 09/17/2024 85  > OR = 60 mL/min/1.73m2 Final    BUN/Creatinine Ratio 09/17/2024 SEE NOTE:  6 - 22 (calc) Final    Sodium 09/17/2024 140  135 - 146 mmol/L Final    Potassium 09/17/2024 4.1  3.5 - 5.3 mmol/L Final    Chloride 09/17/2024 103  98 - 110 mmol/L Final    CO2 09/17/2024 27  20 - 32 mmol/L Final    Calcium 09/17/2024 9.5  8.6 - 10.3 mg/dL Final    Total Protein 09/17/2024 6.4  6.1 - 8.1 g/dL Final    Albumin 09/17/2024 4.3  3.6 - 5.1 g/dL Final    Globulin, Total 09/17/2024  2.1  1.9 - 3.7 g/dL (calc) Final    Albumin/Globulin Ratio 09/17/2024 2.0  1.0 - 2.5 (calc) Final    Total Bilirubin 09/17/2024 0.5  0.2 - 1.2 mg/dL Final    Alkaline Phosphatase 09/17/2024 47  35 - 144 U/L Final    AST 09/17/2024 22  10 - 35 U/L Final    ALT 09/17/2024 26  9 - 46 U/L Final    Color, UA 09/17/2024 YELLOW  YELLOW Final    Appearance, UA 09/17/2024 CLEAR  CLEAR Final    Specific [place], UA 09/17/2024 1.021  1.001 - 1.035 Final    pH, UA 09/17/2024 5.5  5.0 - 8.0 Final    Glucose, UA 09/17/2024 NEGATIVE  NEGATIVE Final    Bilirubin, UA 09/17/2024 NEGATIVE  NEGATIVE Final    Ketones, UA 09/17/2024 NEGATIVE  NEGATIVE Final    Occult Blood UA 09/17/2024 NEGATIVE  NEGATIVE Final    Protein, UA 09/17/2024 NEGATIVE  NEGATIVE Final    Nitrite, UA 09/17/2024 NEGATIVE  NEGATIVE Final    Leukocytes, UA 09/17/2024 NEGATIVE  NEGATIVE Final    WBC Casts, UA 09/17/2024 NONE SEEN  < OR = 5 /HPF Final    RBC Casts, UA 09/17/2024 0-2  < OR = 2 /HPF Final    Squam Epithel, UA 09/17/2024 NONE SEEN  < OR = 5 /HPF Final    Bacteria, UA 09/17/2024 NONE SEEN  NONE SEEN /HPF Final    Hyaline Casts, UA 09/17/2024 NONE SEEN  NONE SEEN /LPF Final    Service Cmt: 09/17/2024 SEE COMMENT   Final    Reflexive Urine Culture 09/17/2024 SEE COMMENT   Final    Creatinine, Urine 09/17/2024 147  20 - 320 mg/dL Final    Microalb, Ur 09/17/2024 0.3  See Note: mg/dL Final    Microalb/Creat Ratio 09/17/2024 2  <30 mg/g creat Final       Past Medical History:   Diagnosis Date    Abdominal weakness 6/27/2018    Cancer     Prostate    Heartburn     Hypertension     Prostatic cancer 8/26/2016     Social History     Socioeconomic History    Marital status:    Tobacco Use    Smoking status: Never    Smokeless tobacco: Never   Substance and Sexual Activity    Alcohol use: No    Drug use: No    Sexual activity: Yes     Partners: Female     Past Surgical History:   Procedure Laterality Date    PROSTATE BIOPSY  2015     Family History   Problem  Relation Name Age of Onset    Lung cancer Father      Multiple sclerosis Sister         The CVD Risk score (VIRAL'Ki, et al., 2008) failed to calculate for the following reasons:    The 2008 CVD risk score is only valid for ages 30 to 74    All of your core healthy metrics are met.      Review of patient's allergies indicates:   Allergen Reactions    Codeine     Penicillins     Vardenafil        Current Outpatient Medications:     atorvastatin (LIPITOR) 40 MG tablet, Take 1 tablet (40 mg total) by mouth once daily., Disp: 90 tablet, Rfl: 3    biotin 10,000 mcg Cap, Take by mouth., Disp: , Rfl:     cholecalciferol, vitamin D3, (VITAMIN D3) 25 mcg (1,000 unit) capsule, Take 1,000 Units by mouth once daily., Disp: , Rfl:     doxycycline (MONODOX) 100 MG capsule, Take 1 capsule (100 mg total) by mouth 2 (two) times daily., Disp: 20 capsule, Rfl: 0    fish oil-omega-3 fatty acids 300-1,000 mg capsule, Take 2 g by mouth once daily., Disp: , Rfl:     iron,carbonyl-vitamin C (VITRON-C) 65 mg iron- 125 mg TbEC, Take by mouth., Disp: , Rfl:     lisinopriL (PRINIVIL,ZESTRIL) 20 MG tablet, Take 1 tablet (20 mg total) by mouth once daily., Disp: 90 tablet, Rfl: 3    magnesium 250 mg Tab, Take by mouth once., Disp: , Rfl:     multivit-min/ferrous fumarate (MULTI VITAMIN ORAL), Take by mouth., Disp: , Rfl:     vit A/C/E ac/ZnOx/cupric oxide (EYE VITAMIN AND MINERALS ORAL), Take by mouth., Disp: , Rfl:     traMADoL (ULTRAM) 50 mg tablet, Take 1 tablet (50 mg total) by mouth 2 (two) times daily as needed for Pain. (Patient not taking: Reported on 9/23/2024), Disp: 20 tablet, Rfl: 0    Review of Systems   Constitutional:  Negative for appetite change, chills, fatigue, fever and unexpected weight change.   HENT:  Negative for ear pain and trouble swallowing.    Eyes:  Negative for pain, discharge and visual disturbance.   Respiratory:  Negative for apnea, cough, shortness of breath and wheezing.    Cardiovascular:  Negative for  "chest pain and leg swelling.   Gastrointestinal:  Negative for abdominal pain, blood in stool, constipation, diarrhea, nausea, vomiting and reflux.   Endocrine: Negative for cold intolerance, heat intolerance and polydipsia.   Genitourinary:  Negative for bladder incontinence, dysuria, erectile dysfunction, frequency, hematuria, testicular pain and urgency.   Musculoskeletal:  Negative for gait problem, joint swelling and myalgias.   Neurological:  Negative for dizziness, seizures and numbness.   Psychiatric/Behavioral:  Negative for agitation, behavioral problems and hallucinations. The patient is not nervous/anxious.            Objective:      Vitals:    09/23/24 1424   BP: 120/60   Pulse: 96   Weight: 75.3 kg (166 lb)   Height: 5' 7" (1.702 m)     Physical Exam  Vitals and nursing note reviewed.   Constitutional:       General: He is not in acute distress.     Appearance: Normal appearance. He is well-developed. He is not toxic-appearing.   HENT:      Head: Normocephalic and atraumatic.      Right Ear: Tympanic membrane and external ear normal.      Left Ear: Tympanic membrane and external ear normal.      Ears:      Comments: Patient is deaf but communicates by reading lips and can still talk.     Nose: Nose normal.      Mouth/Throat:      Pharynx: Oropharynx is clear. No posterior oropharyngeal erythema.   Eyes:      Pupils: Pupils are equal, round, and reactive to light.   Neck:      Thyroid: No thyromegaly.      Vascular: No carotid bruit.   Cardiovascular:      Rate and Rhythm: Normal rate and regular rhythm.      Heart sounds: Normal heart sounds. No murmur heard.  Pulmonary:      Effort: Pulmonary effort is normal.      Breath sounds: Normal breath sounds. No wheezing or rales.   Abdominal:      General: Bowel sounds are normal. There is no distension.      Palpations: Abdomen is soft.      Tenderness: There is no abdominal tenderness.      Hernia: A hernia (Small reducible umbilical hernia present) is " present.   Musculoskeletal:         General: No tenderness or deformity. Normal range of motion.      Cervical back: Normal range of motion and neck supple.      Lumbar back: Normal. No spasms.      Comments: Bends 90 degrees at  waist, shoulders and knees have full range of motion, no pitting edema to lower extremities, knees are crepitant bilaterally.  Gait is normal.   Lymphadenopathy:      Cervical: No cervical adenopathy.   Skin:     General: Skin is warm and dry.      Findings: No rash.   Neurological:      General: No focal deficit present.      Mental Status: He is alert and oriented to person, place, and time. Mental status is at baseline.      Cranial Nerves: No cranial nerve deficit.      Coordination: Coordination normal.      Gait: Gait normal.   Psychiatric:         Mood and Affect: Mood normal.         Behavior: Behavior normal.         Thought Content: Thought content normal.         Judgment: Judgment normal.           Assessment:       1. Bilateral deafness    2. Mixed hyperlipidemia    3. Primary hypertension    4. History of prostate cancer    5. Umbilical hernia without obstruction and without gangrene    6. Right sciatic nerve pain    7. Acute right-sided low back pain with right-sided sciatica         Plan:       Bilateral deafness  Patient doing well and can communicate through sign language and liberating.  Mixed hyperlipidemia  Cholesterol 148 HDL 46 TG 74 LDL 88, excellent lipid panel.  Primary hypertension  Blood pressure well controlled with current regimen  History of prostate cancer  PSA followed by his urologist Dr. Hernandez  Umbilical hernia without obstruction and without gangrene  Reducible and asymptomatic  Right sciatic nerve pain  Does not take much medication other than Tylenol for this  Acute right-sided low back pain with right-sided sciatica  Tylenol as needed    Follow up in about 6 months (around 3/23/2025), or -hyperlipidemia-hypertension, deaf.        9/23/2024 Henry STRATTON  [place]asi

## 2024-09-23 NOTE — TELEPHONE ENCOUNTER
----- Message from Alissa Gary LPN sent at 9/23/2024  3:27 PM CDT -----    ----- Message -----  From: Marcelina Quintanilla  Sent: 9/23/2024   3:25 PM CDT  To: Henry Villaseñor Staff    Pt would like to have an  for his next appointment.    [phone]

## 2025-02-22 DIAGNOSIS — Z00.00 ENCOUNTER FOR MEDICARE ANNUAL WELLNESS EXAM: ICD-10-CM

## 2025-03-20 ENCOUNTER — TELEPHONE (OUTPATIENT)
Dept: FAMILY MEDICINE | Facility: CLINIC | Age: 80
End: 2025-03-20
Payer: MEDICARE

## 2025-04-03 ENCOUNTER — TELEPHONE (OUTPATIENT)
Dept: FAMILY MEDICINE | Facility: CLINIC | Age: 80
End: 2025-04-03
Payer: MEDICARE

## 2025-04-07 ENCOUNTER — TELEPHONE (OUTPATIENT)
Dept: FAMILY MEDICINE | Facility: CLINIC | Age: 80
End: 2025-04-07

## 2025-04-07 ENCOUNTER — OFFICE VISIT (OUTPATIENT)
Dept: FAMILY MEDICINE | Facility: CLINIC | Age: 80
End: 2025-04-07
Payer: MEDICARE

## 2025-04-07 VITALS
OXYGEN SATURATION: 97 % | SYSTOLIC BLOOD PRESSURE: 126 MMHG | HEIGHT: 67 IN | WEIGHT: 165.19 LBS | BODY MASS INDEX: 25.93 KG/M2 | DIASTOLIC BLOOD PRESSURE: 68 MMHG | HEART RATE: 83 BPM

## 2025-04-07 DIAGNOSIS — E78.2 MIXED HYPERLIPIDEMIA: ICD-10-CM

## 2025-04-07 DIAGNOSIS — I10 PRIMARY HYPERTENSION: Chronic | ICD-10-CM

## 2025-04-07 DIAGNOSIS — H91.93 BILATERAL DEAFNESS: ICD-10-CM

## 2025-04-07 DIAGNOSIS — H11.001 PTERYGIUM OF RIGHT EYE: ICD-10-CM

## 2025-04-07 DIAGNOSIS — M17.12 ARTHRITIS OF LEFT KNEE: ICD-10-CM

## 2025-04-07 DIAGNOSIS — K42.9 UMBILICAL HERNIA WITHOUT OBSTRUCTION AND WITHOUT GANGRENE: ICD-10-CM

## 2025-04-07 DIAGNOSIS — I10 ESSENTIAL HYPERTENSION: Primary | ICD-10-CM

## 2025-04-07 DIAGNOSIS — Z85.46 HISTORY OF PROSTATE CANCER: ICD-10-CM

## 2025-04-07 DIAGNOSIS — R26.89 DECREASED BACK MOBILITY: ICD-10-CM

## 2025-04-07 DIAGNOSIS — C61 MALIGNANT NEOPLASM OF PROSTATE: ICD-10-CM

## 2025-04-07 PROCEDURE — 3288F FALL RISK ASSESSMENT DOCD: CPT | Mod: CPTII,S$GLB,, | Performed by: FAMILY MEDICINE

## 2025-04-07 PROCEDURE — 1101F PT FALLS ASSESS-DOCD LE1/YR: CPT | Mod: CPTII,S$GLB,, | Performed by: FAMILY MEDICINE

## 2025-04-07 PROCEDURE — 3078F DIAST BP <80 MM HG: CPT | Mod: CPTII,S$GLB,, | Performed by: FAMILY MEDICINE

## 2025-04-07 PROCEDURE — 99214 OFFICE O/P EST MOD 30 MIN: CPT | Mod: S$GLB,,, | Performed by: FAMILY MEDICINE

## 2025-04-07 PROCEDURE — 1159F MED LIST DOCD IN RCRD: CPT | Mod: CPTII,S$GLB,, | Performed by: FAMILY MEDICINE

## 2025-04-07 PROCEDURE — 3074F SYST BP LT 130 MM HG: CPT | Mod: CPTII,S$GLB,, | Performed by: FAMILY MEDICINE

## 2025-04-07 RX ORDER — LISINOPRIL 20 MG/1
20 TABLET ORAL DAILY
Qty: 90 TABLET | Refills: 3 | Status: SHIPPED | OUTPATIENT
Start: 2025-04-07

## 2025-04-07 RX ORDER — OFLOXACIN 3 MG/ML
1 SOLUTION/ DROPS OPHTHALMIC 4 TIMES DAILY
COMMUNITY
Start: 2025-03-18

## 2025-04-07 RX ORDER — ATORVASTATIN CALCIUM 40 MG/1
40 TABLET, FILM COATED ORAL DAILY
Qty: 90 TABLET | Refills: 3 | Status: SHIPPED | OUTPATIENT
Start: 2025-04-07 | End: 2025-05-07

## 2025-04-07 RX ORDER — NEOMYCIN SULFATE, POLYMYXIN B SULFATE, AND DEXAMETHASONE 3.5; 10000; 1 MG/G; [USP'U]/G; MG/G
1 OINTMENT OPHTHALMIC EVERY 4 HOURS
COMMUNITY
Start: 2025-04-01

## 2025-04-07 RX ORDER — PREDNISOLONE ACETATE 10 MG/ML
1 SUSPENSION/ DROPS OPHTHALMIC 4 TIMES DAILY
COMMUNITY
Start: 2025-03-18

## 2025-04-07 NOTE — TELEPHONE ENCOUNTER
----- Message from Nurse Maxwell sent at 4/7/2025  3:23 PM CDT -----    ----- Message -----  From: Denise Montejo  Sent: 4/7/2025   3:09 PM CDT  To: Henry Villaseñor Staff    Mr Amezquita next appointment is for 10/29/2025 at 2.00 PM.  He wants a live ,

## 2025-04-07 NOTE — PROGRESS NOTES
SUBJECTIVE:    Patient ID: Robert Jessica is a 80 y.o. male.    Chief Complaint: Follow-up (6 mo follow up/ lab work in Epic/ brought bottles/ bilateral cataract surgery in 01/2024 and 03/2024, 1 surgery on left eye and 2 surgeries right eye with Dr. Pillai, has appt in May, having difficulty seeing now, awaiting exam to get glasses/ right hip pain occasionally when sits then stands x years/ forgot BP log//mp)    Follow-up        History of Present Illness    CHIEF COMPLAINT:  Robert presents today for follow up of eye condition    OCULAR:  He reports bilateral eye symptoms with right eye being more severe. Right eye symptoms include sun sensitivity, burning, and scratching sensations that improve with eye drops. Left eye has mild blurriness. He has difficulty driving due to vision issues, particularly with right eye, and continues to have sun sensitivity despite wearing sunglasses. He uses prescribed eye drops 4 times daily.    He has history of pterygium surgery and occupational metal foreign body removal after 5-year retention. He has upcoming eye exam on May 5th for possible glasses prescription and driving assessment.    MUSCULOSKELETAL:  He reports right knee pain with ambulation and when rising from seated position. He also experiences right hip pain upon standing that improves with continued walking. Hip pain was previously more severe with each step. History of three back surgeries.    ONCOLOGIC:  History of surgically treated prostate cancer, now resolved. He undergoes annual labs in September for surveillance.    SOCIAL HISTORY:  Former  with 55 years of experience, now retired.      ROS:  Constitutional: -appetite change, -chills, -fatigue, -fever, -unexpected weight change  HENT: -ear pain, -trouble swallowing  Eyes: -pain, -discharge, -visual disturbance, +blurry vision, +photophobia  Respiratory: -apnea, -cough, -shortness of breath, -wheezing  Cardiovascular: -chest pain, -leg  swelling  Gastrointestinal: -abdominal pain, -blood in stool, -constipation, -diarrhea, -nausea, -vomiting, -reflux  Endocrine: -cold intolerance, -heat intolerance, -polydipsia  Genitourinary: -bladder incontinence, -dysuria, -erectile dysfunction, -frequency, -hematuria, -testicular pain, -urgency, -nocturia  Musculoskeletal: -gait problem, -joint swelling, -myalgia, +limb pain, +pain with movement, +joint pain  Neurological: -dizziness, -seizures, -numbness  Psychiatric/Behavioral: -agitation, -hallucinations, -nervous, -anxiety symptoms         No visits with results within 6 Month(s) from this visit.   Latest known visit with results is:   Office Visit on 09/23/2024   Component Date Value Ref Range Status    Glucose 04/03/2025 105 (H)  65 - 99 mg/dL Final    BUN 04/03/2025 21  7 - 25 mg/dL Final    Creatinine 04/03/2025 1.00  0.70 - 1.28 mg/dL Final    eGFR 04/03/2025 77  > OR = 60 mL/min/1.73m2 Final    BUN/Creatinine Ratio 04/03/2025 SEE NOTE:  6 - 22 (calc) Final    Sodium 04/03/2025 139  135 - 146 mmol/L Final    Potassium 04/03/2025 4.5  3.5 - 5.3 mmol/L Final    Chloride 04/03/2025 101  98 - 110 mmol/L Final    CO2 04/03/2025 30  20 - 32 mmol/L Final    Calcium 04/03/2025 9.7  8.6 - 10.3 mg/dL Final    Total Protein 04/03/2025 6.5  6.1 - 8.1 g/dL Final    Albumin 04/03/2025 4.3  3.6 - 5.1 g/dL Final    Globulin, Total 04/03/2025 2.2  1.9 - 3.7 g/dL (calc) Final    Albumin/Globulin Ratio 04/03/2025 2.0  1.0 - 2.5 (calc) Final    Total Bilirubin 04/03/2025 0.5  0.2 - 1.2 mg/dL Final    Alkaline Phosphatase 04/03/2025 49  35 - 144 U/L Final    AST 04/03/2025 19  10 - 35 U/L Final    ALT 04/03/2025 24  9 - 46 U/L Final    Cholesterol 04/03/2025 148  <200 mg/dL Final    HDL 04/03/2025 55  > OR = 40 mg/dL Final    Triglycerides 04/03/2025 56  <150 mg/dL Final    LDL Cholesterol 04/03/2025 79  mg/dL (calc) Final    HDL/Cholesterol Ratio 04/03/2025 2.7  <5.0 (calc) Final    Non HDL Chol. (LDL+VLDL) 04/03/2025 93   "<130 mg/dL (calc) Final       Past Medical History:   Diagnosis Date    Abdominal weakness 6/27/2018    Cancer     Prostate    Heartburn     Hypertension     Prostatic cancer 8/26/2016     Social History[1]  Past Surgical History:   Procedure Laterality Date    bilateral eye cataract surgery  Bilateral 01/2024 01/01/2024 and 03/2025    PROSTATE BIOPSY  2015     Family History   Problem Relation Name Age of Onset    Lung cancer Father      Multiple sclerosis Sister         The CVD Risk score (Mami et al., 2008) failed to calculate for the following reasons:    The 2008 CVD risk score is only valid for ages 30 to 74    All of your core healthy metrics are met.      Review of patient's allergies indicates:   Allergen Reactions    Codeine     Penicillins     Vardenafil      Current Medications[2]    Review of Systems        Objective:      Vitals:    04/07/25 1413   BP: 126/68   Pulse: 83   SpO2: 97%   Weight: 74.9 kg (165 lb 3.2 oz)   Height: 5' 7" (1.702 m)     Physical Exam  Vitals and nursing note reviewed. Exam conducted with a chaperone present.   Constitutional:       General: He is not in acute distress.     Appearance: Normal appearance. He is well-developed. He is not toxic-appearing.      Comments: He communicates through sign language through    HENT:      Head: Normocephalic and atraumatic.      Right Ear: Tympanic membrane and external ear normal.      Left Ear: Tympanic membrane and external ear normal.      Ears:      Comments: Deaf in both ears     Nose: Nose normal.      Mouth/Throat:      Pharynx: Oropharynx is clear. No posterior oropharyngeal erythema.   Eyes:      Pupils: Pupils are equal, round, and reactive to light.      Comments: Right eye conjunctiva red and irritated   Neck:      Thyroid: No thyromegaly.      Vascular: No carotid bruit.   Cardiovascular:      Rate and Rhythm: Normal rate and regular rhythm.      Heart sounds: Normal heart sounds. No murmur " heard.  Pulmonary:      Effort: Pulmonary effort is normal.      Breath sounds: Normal breath sounds. No wheezing or rales.   Abdominal:      General: Bowel sounds are normal. There is no distension.      Palpations: Abdomen is soft.      Tenderness: There is no abdominal tenderness.      Hernia: A hernia (reducible  umbilical hernia) is present.   Musculoskeletal:         General: No tenderness or deformity. Normal range of motion.      Cervical back: Normal range of motion and neck supple.      Lumbar back: Normal. No spasms.      Comments: Bends 90 degrees at  waist, shoulders and knees have full range of motion, no pitting edema to lower extremities, knees are slightly crepitant   Lymphadenopathy:      Cervical: No cervical adenopathy.   Skin:     General: Skin is warm and dry.      Findings: No rash.   Neurological:      General: No focal deficit present.      Mental Status: He is alert and oriented to person, place, and time. Mental status is at baseline.      Cranial Nerves: No cranial nerve deficit.      Coordination: Coordination normal.      Gait: Gait normal.   Psychiatric:         Mood and Affect: Mood normal.         Behavior: Behavior normal.         Thought Content: Thought content normal.         Judgment: Judgment normal.       Physical Exam    Cardiovascular: Normal heart sounds.  Skin: Normal skin exam.  Vitals: Normal blood pressure.             Assessment:       1. Essential hypertension    2. Mixed hyperlipidemia    3. Bilateral deafness    4. Primary hypertension    5. History of prostate cancer    6. Umbilical hernia without obstruction and without gangrene    7. Arthritis of left knee    8. Decreased back mobility    9. Pterygium of right eye    10. Malignant neoplasm of prostate         Plan:       Essential hypertension  Comments:  Currently stable and well controlled.  Continue lisinopril 20mg.  Refill sent today.  Orders:  -     lisinopriL (PRINIVIL,ZESTRIL) 20 MG tablet; Take 1 tablet  (20 mg total) by mouth once daily.  Dispense: 90 tablet; Refill: 3    Mixed hyperlipidemia  Comments:  . Continue Lipitor 40mg. Refill sent today.  Orders:  -     atorvastatin (LIPITOR) 40 MG tablet; Take 1 tablet (40 mg total) by mouth once daily.  Dispense: 90 tablet; Refill: 3  Cholesterol 148 HDL 55 TG 56 LDL 79  Bilateral deafness  Communicates through sign language  Primary hypertension  Blood pressure well controlled  History of prostate cancer  In remission  Umbilical hernia without obstruction and without gangrene  Has a small reducible umbilical hernia, does not need surgery yet  Arthritis of left knee  Managing conservatively  Decreased back mobility    Pterygium of right eye  Continue eyedrops and follow-up with Ophthalmology  Malignant neoplasm of prostate  Recommended Shingrix and RSV vaccine due to his age    Assessment & Plan    C61 Malignant neoplasm of prostate  H57.11 Ocular pain, right eye  M25.561 Pain in right knee  M25.551 Pain in right hip  I10 Essential (primary) hypertension  E78.5 Hyperlipidemia, unspecified  Z85.46 Personal history of malignant neoplasm of prostate  Z57.2 Occupational exposure to dust    IMPRESSION:  - Reviewed recent eye surgery for pterygium removal, noting good recovery but ongoing sensitivity and blurriness in right eye.  - Cardiovascular health stable with no reported chest pain or shortness of breath.  - Joint pain intermittent in right knee and hip, particularly when initiating movement.  - Recent lab results show normal cholesterol levels, good kidney and liver function, and appropriate glucose levels.  - Explained observed skin lesion on back of neck appears to be benign blackhead, not cancerous growth.    MALIGNANT NEOPLASM OF PROSTATE:  - Evaluated patient's current condition; prostate cancer is in remission.  - Assessed labs results showing normal cholesterol (148), good sugar levels, and healthy kidney and liver function.  - Monitor prostate cancer status  through annual labs.  - Schedule follow up in September for next prostate cancer screening blood test.    RIGHT EYE CONDITION:  - Evaluated post-surgical status of pterygium removal in the right eye, noting ongoing pain, redness, and sensitivity for several weeks.  - Current blurry vision is worse than the left eye.  - Prescribed Tetragam eye drops to be administered 4 times daily.  - Monitor patient's response to treatment and adherence to the regimen.  - Schedule follow-up visit to reassess the eye condition.    RIGHT KNEE PAIN:  - Monitored patient's report of intermittent right knee pain, particularly when walking or rising.  - Evaluated ability to perform daily activities despite pain.  - Assessed as part of the patient's arthritis symptoms.    RIGHT HIP PAIN:  - Monitored patient's report of right hip pain, particularly when rising, noting improvement from previous severity.  - Evaluated patient's suspicion of grinding in the hip joint and ability to walk and perform daily activities.  - Assessed as part of the patient's arthritis symptoms.    HYPERTENSION:  - Evaluated patient's blood pressure, reported to be good.  - Continue current medications and refill at [facility].    HYPERLIPIDEMIA:  - Continue current medications and refill at [facility].    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  - Evaluated patient's concern about dust exposure affecting his eyes post-surgery.  - Assessed potential link between occupational dust exposure and the recent eye condition.    GENERAL HEALTH RECOMMENDATIONS:  - Encourage patient to continue staying active to maintain overall health.  - Educated on availability and purpose of shingles and RSV vaccinations, recommending optional administration at pharmacy.         Follow up in about 6 months (around 10/7/2025), or hld, deaf.        This note was generated with the assistance of  ambient listening technology. Verbal consent was obtained by the patient and accompanying visitor(s) for the recording of patient appointment to facilitate this note. I attest to having reviewed and edited the generated note for accuracy, though some syntax or spelling errors may persist. Please contact the author of this note for any clarification.      4/7/2025 Henry Villaseñor           [1]   Social History  Socioeconomic History    Marital status:    Tobacco Use    Smoking status: Never    Smokeless tobacco: Never   Substance and Sexual Activity    Alcohol use: No    Drug use: No    Sexual activity: Yes     Partners: Female   [2]   Current Outpatient Medications:     cholecalciferol, vitamin D3, (VITAMIN D3) 25 mcg (1,000 unit) capsule, Take 1,000 Units by mouth once daily., Disp: , Rfl:     fish oil-omega-3 fatty acids 300-1,000 mg capsule, Take 2 g by mouth once daily., Disp: , Rfl:     magnesium 250 mg Tab, Take by mouth once., Disp: , Rfl:     multivit-min/ferrous fumarate (MULTI VITAMIN ORAL), Take by mouth., Disp: , Rfl:     neomycin-polymyxin-dexamethasone (DEXACINE) 3.5 mg/g-10,000 unit/g-0.1 % Oint, Place 1 g into the right eye every 4 (four) hours., Disp: , Rfl:     ofloxacin (OCUFLOX) 0.3 % ophthalmic solution, Place 1 drop into the right eye 4 (four) times daily., Disp: , Rfl:     prednisoLONE acetate (PRED FORTE) 1 % DrpS, Place 1 drop into the right eye 4 (four) times daily., Disp: , Rfl:     vit A/C/E ac/ZnOx/cupric oxide (EYE VITAMIN AND MINERALS ORAL), Take by mouth., Disp: , Rfl:     atorvastatin (LIPITOR) 40 MG tablet, Take 1 tablet (40 mg total) by mouth once daily., Disp: 90 tablet, Rfl: 3    lisinopriL (PRINIVIL,ZESTRIL) 20 MG tablet, Take 1 tablet (20 mg total) by mouth once daily., Disp: 90 tablet, Rfl: 3

## 2025-04-07 NOTE — LETTER
AUTHORIZATION FOR RELEASE OF   CONFIDENTIAL INFORMATION    Dear Dr. Pillai,    We are seeing Robert Jessica, date of birth 1945, in the clinic at [facility]. Henry Villaseñor MD is the patient's PCP. Robert Jessica has an outstanding lab/procedure at the time we reviewed his chart. In order to help keep his health information updated, he has authorized us to request the following medical record(s):        (  )  MAMMOGRAM                                      (  )  COLONOSCOPY      (  )  PAP SMEAR                                          (  )  OUTSIDE LAB RESULTS     (  )  DEXA SCAN                                          ( X )  EYE EXAM            (  )  FOOT EXAM                                          (  )  ENTIRE RECORD     (  )  OUTSIDE IMMUNIZATIONS                 (X)  Bilateral Cataract Surgery______________         Please fax records to [facility], Casey, Michael D., MD, [phone]        Patient Name: Robert Jessica  : 1945  Patient Phone #: [phone]

## 2025-04-07 NOTE — LETTER
AUTHORIZATION FOR RELEASE OF   CONFIDENTIAL INFORMATION    Dear Dr. Pillai,    We are seeing Robert Jessica, date of birth 1945, in the clinic at [facility]. Henry Villaseñor MD is the patient's PCP. Robert Jessica has an outstanding lab/procedure at the time we reviewed his chart. In order to help keep his health information updated, he has authorized us to request the following medical record(s):        (  )  MAMMOGRAM                                      (  )  COLONOSCOPY      (  )  PAP SMEAR                                          (  )  OUTSIDE LAB RESULTS     (  )  DEXA SCAN                                          ( x )  EYE EXAM            (  )  FOOT EXAM                                          (  )  ENTIRE RECORD     (  )  OUTSIDE IMMUNIZATIONS                 ( x )  Bilateral Cataract Surgery______________         Please fax records to [facility], Casey, Michael D., MD, [phone]        Patient Name: Robert Jessica  : 1945  Patient Phone #: [phone]

## 2025-04-09 ENCOUNTER — PATIENT OUTREACH (OUTPATIENT)
Dept: ADMINISTRATIVE | Facility: HOSPITAL | Age: 80
End: 2025-04-09
Payer: MEDICARE

## 2025-04-11 ENCOUNTER — PATIENT OUTREACH (OUTPATIENT)
Dept: ADMINISTRATIVE | Facility: HOSPITAL | Age: 80
End: 2025-04-11
Payer: MEDICARE